# Patient Record
Sex: MALE | Race: WHITE | Employment: FULL TIME | URBAN - NONMETROPOLITAN AREA
[De-identification: names, ages, dates, MRNs, and addresses within clinical notes are randomized per-mention and may not be internally consistent; named-entity substitution may affect disease eponyms.]

---

## 2017-06-05 ENCOUNTER — TELEPHONE (OUTPATIENT)
Dept: FAMILY MEDICINE CLINIC | Age: 42
End: 2017-06-05

## 2017-06-06 ENCOUNTER — TELEPHONE (OUTPATIENT)
Dept: FAMILY MEDICINE CLINIC | Age: 42
End: 2017-06-06

## 2017-06-21 ENCOUNTER — OFFICE VISIT (OUTPATIENT)
Dept: FAMILY MEDICINE CLINIC | Age: 42
End: 2017-06-21

## 2017-06-21 VITALS
HEART RATE: 102 BPM | HEIGHT: 69 IN | DIASTOLIC BLOOD PRESSURE: 82 MMHG | SYSTOLIC BLOOD PRESSURE: 132 MMHG | BODY MASS INDEX: 25.39 KG/M2 | TEMPERATURE: 98.8 F | WEIGHT: 171.4 LBS

## 2017-06-21 DIAGNOSIS — L98.9 SKIN LESION OF SCALP: ICD-10-CM

## 2017-06-21 DIAGNOSIS — M75.82 ROTATOR CUFF TENDINITIS, LEFT: ICD-10-CM

## 2017-06-21 DIAGNOSIS — Z13.1 ENCOUNTER FOR SCREENING FOR DIABETES MELLITUS: Primary | ICD-10-CM

## 2017-06-21 DIAGNOSIS — I10 ESSENTIAL HYPERTENSION: ICD-10-CM

## 2017-06-21 PROCEDURE — 99203 OFFICE O/P NEW LOW 30 MIN: CPT | Performed by: NURSE PRACTITIONER

## 2017-06-21 ASSESSMENT — PATIENT HEALTH QUESTIONNAIRE - PHQ9
2. FEELING DOWN, DEPRESSED OR HOPELESS: 0
SUM OF ALL RESPONSES TO PHQ9 QUESTIONS 1 & 2: 0
1. LITTLE INTEREST OR PLEASURE IN DOING THINGS: 0
SUM OF ALL RESPONSES TO PHQ QUESTIONS 1-9: 0

## 2017-10-19 ENCOUNTER — OFFICE VISIT (OUTPATIENT)
Dept: CARDIOLOGY CLINIC | Age: 42
End: 2017-10-19
Payer: MEDICAID

## 2017-10-19 VITALS
SYSTOLIC BLOOD PRESSURE: 122 MMHG | HEIGHT: 69 IN | DIASTOLIC BLOOD PRESSURE: 64 MMHG | BODY MASS INDEX: 25.62 KG/M2 | WEIGHT: 173 LBS | HEART RATE: 82 BPM

## 2017-10-19 DIAGNOSIS — F17.200 SMOKING: ICD-10-CM

## 2017-10-19 DIAGNOSIS — I10 ESSENTIAL HYPERTENSION: ICD-10-CM

## 2017-10-19 DIAGNOSIS — R07.9 CHEST PAIN, UNSPECIFIED TYPE: Primary | ICD-10-CM

## 2017-10-19 PROCEDURE — G8484 FLU IMMUNIZE NO ADMIN: HCPCS | Performed by: NUCLEAR MEDICINE

## 2017-10-19 PROCEDURE — G8417 CALC BMI ABV UP PARAM F/U: HCPCS | Performed by: NUCLEAR MEDICINE

## 2017-10-19 PROCEDURE — 99213 OFFICE O/P EST LOW 20 MIN: CPT | Performed by: NUCLEAR MEDICINE

## 2017-10-19 PROCEDURE — 4004F PT TOBACCO SCREEN RCVD TLK: CPT | Performed by: NUCLEAR MEDICINE

## 2017-10-19 PROCEDURE — 93000 ELECTROCARDIOGRAM COMPLETE: CPT | Performed by: NUCLEAR MEDICINE

## 2017-10-19 PROCEDURE — G8428 CUR MEDS NOT DOCUMENT: HCPCS | Performed by: NUCLEAR MEDICINE

## 2017-10-19 RX ORDER — AMLODIPINE BESYLATE 10 MG/1
10 TABLET ORAL DAILY
Qty: 90 TABLET | Refills: 4 | Status: SHIPPED | OUTPATIENT
Start: 2017-10-19 | End: 2018-11-08 | Stop reason: SDUPTHER

## 2017-10-19 RX ORDER — HYDROCHLOROTHIAZIDE 25 MG/1
12.5 TABLET ORAL DAILY
Qty: 45 TABLET | Refills: 4 | Status: SHIPPED | OUTPATIENT
Start: 2017-10-19 | End: 2018-11-08 | Stop reason: SDUPTHER

## 2017-10-19 NOTE — PROGRESS NOTES
[facility] [address] [address]  Dept: [phone]  Dept Fax: [phone]  Loc: [phone]    Visit Date: 10/19/2017    Marylou Sharma is a 40 y.o. male who presents today for:  Chief Complaint   Patient presents with    Check-Up    Hypertension     No more chest pain   No ER visits  Stress test was okay   No cath   Still smoking   No changes in breathing  BP is much better  No other new issues     HPI:  HPI  Past Medical History:   Diagnosis Date    Blind     left eye    Essential hypertension 6/21/2017    Hypertension     10/7/2015      Past Surgical History:   Procedure Laterality Date    EYE SURGERY      Glass eye placement, left eye. Family History   Problem Relation Age of Onset    Heart Disease Father     High Blood Pressure Father     High Blood Pressure Mother      Social History   Substance Use Topics    Smoking status: Current Every Day Smoker     Packs/day: 0.50     Years: 22.00     Types: Cigarettes    Smokeless tobacco: Never Used    Alcohol use Yes      Comment: socially      Current Outpatient Prescriptions   Medication Sig Dispense Refill    amLODIPine (NORVASC) 10 MG tablet Take 1 tablet by mouth daily 90 tablet 4    hydrochlorothiazide (HYDRODIURIL) 25 MG tablet Take 0.5 tablets by mouth daily 45 tablet 4    OMEPRAZOLE PO Take 20 mg by mouth Take 2 tabs daily       No current facility-administered medications for this visit.       No Known Allergies  Health Maintenance   Topic Date Due    HIV screen  05/17/1991    DTaP/Tdap/Td vaccine (1 - Tdap) 05/17/1995    Diabetes screen  05/17/2016    Flu vaccine (1) 09/01/2017    Lipid screen  10/08/2020    Pneumococcal med risk  Completed       Subjective:  Review of Systems  General:   No fever, no chills, No fatigue or weight loss  Pulmonary:    some dyspnea, no wheezing  Cardiac:    Denies recent chest pain,   GI:     No nausea or vomiting, no abdominal

## 2018-01-22 ENCOUNTER — APPOINTMENT (OUTPATIENT)
Dept: GENERAL RADIOLOGY | Age: 43
End: 2018-01-22
Payer: MEDICAID

## 2018-01-22 ENCOUNTER — HOSPITAL ENCOUNTER (OUTPATIENT)
Age: 43
Setting detail: OBSERVATION
Discharge: HOME | End: 2018-01-23
Attending: FAMILY MEDICINE | Admitting: INTERNAL MEDICINE
Payer: MEDICAID

## 2018-01-22 DIAGNOSIS — R07.9 CHEST PAIN, UNSPECIFIED TYPE: Primary | ICD-10-CM

## 2018-01-22 LAB
ALBUMIN SERPL-MCNC: 4.4 G/DL (ref 3.5–5.1)
ALP BLD-CCNC: 48 U/L (ref 38–126)
ALT SERPL-CCNC: 14 U/L (ref 11–66)
APTT: 29.6 SECONDS (ref 22–38)
AST SERPL-CCNC: 11 U/L (ref 5–40)
BACTERIA: NORMAL /HPF
BASOPHILS # BLD: 0.8 %
BASOPHILS ABSOLUTE: 0 THOU/MM3 (ref 0–0.1)
BILIRUB SERPL-MCNC: 0.4 MG/DL (ref 0.3–1.2)
BILIRUBIN URINE: NEGATIVE
BLOOD, URINE: NEGATIVE
BUN BLDV-MCNC: 13 MG/DL (ref 7–22)
CALCIUM SERPL-MCNC: 9.3 MG/DL (ref 8.5–10.5)
CASTS 2: NORMAL /LPF
CASTS UA: NORMAL /LPF
CHARACTER, URINE: CLEAR
CHLORIDE BLD-SCNC: 100 MEQ/L (ref 98–111)
CO2: 25 MEQ/L (ref 23–33)
COLOR: COLORLESS
CREAT SERPL-MCNC: 0.8 MG/DL (ref 0.4–1.2)
CRYSTALS, UA: NORMAL
EKG ATRIAL RATE: 73 BPM
EKG P AXIS: 70 DEGREES
EKG P-R INTERVAL: 144 MS
EKG Q-T INTERVAL: 398 MS
EKG QRS DURATION: 110 MS
EKG QTC CALCULATION (BAZETT): 438 MS
EKG R AXIS: 74 DEGREES
EKG T AXIS: 37 DEGREES
EKG VENTRICULAR RATE: 73 BPM
EOSINOPHIL # BLD: 3.4 %
EOSINOPHILS ABSOLUTE: 0.2 THOU/MM3 (ref 0–0.4)
EPITHELIAL CELLS, UA: NORMAL /HPF
GFR SERPL CREATININE-BSD FRML MDRD: > 90 ML/MIN/1.73M2
GLUCOSE BLD-MCNC: 129 MG/DL (ref 70–108)
GLUCOSE URINE: NEGATIVE MG/DL
HCT VFR BLD CALC: 44.2 % (ref 42–52)
HEMOGLOBIN: 15.4 GM/DL (ref 14–18)
INR BLD: 0.97 (ref 0.85–1.13)
KETONES, URINE: NEGATIVE
LACTIC ACID: 1.2 MMOL/L (ref 0.5–2.2)
LEUKOCYTE ESTERASE, URINE: NEGATIVE
LIPASE: 22.1 U/L (ref 5.6–51.3)
LYMPHOCYTES # BLD: 31.1 %
LYMPHOCYTES ABSOLUTE: 1.9 THOU/MM3 (ref 1–4.8)
MAGNESIUM: 2.1 MG/DL (ref 1.6–2.4)
MCH RBC QN AUTO: 32.7 PG (ref 27–31)
MCHC RBC AUTO-ENTMCNC: 34.8 GM/DL (ref 33–37)
MCV RBC AUTO: 93.8 FL (ref 80–94)
MISCELLANEOUS 2: NORMAL
MONOCYTES # BLD: 5.2 %
MONOCYTES ABSOLUTE: 0.3 THOU/MM3 (ref 0.4–1.3)
NITRITE, URINE: NEGATIVE
NUCLEATED RED BLOOD CELLS: 0 /100 WBC
OSMOLALITY CALCULATION: 277.5 MOSMOL/KG (ref 275–300)
PDW BLD-RTO: 13.4 % (ref 11.5–14.5)
PH UA: 7
PLATELET # BLD: 310 THOU/MM3 (ref 130–400)
PMV BLD AUTO: 7.2 MCM (ref 7.4–10.4)
POTASSIUM REFLEX MAGNESIUM: 3.5 MEQ/L (ref 3.5–5.2)
PRO-BNP: 27.7 PG/ML (ref 0–450)
PROTEIN UA: NEGATIVE
RBC # BLD: 4.71 MILL/MM3 (ref 4.7–6.1)
RBC URINE: NORMAL /HPF
RENAL EPITHELIAL, UA: NORMAL
SEG NEUTROPHILS: 59.5 %
SEGMENTED NEUTROPHILS ABSOLUTE COUNT: 3.6 THOU/MM3 (ref 1.8–7.7)
SODIUM BLD-SCNC: 138 MEQ/L (ref 135–145)
SPECIFIC GRAVITY, URINE: < 1.005 (ref 1–1.03)
TOTAL PROTEIN: 6.5 G/DL (ref 6.1–8)
TROPONIN T: < 0.01 NG/ML
TSH SERPL DL<=0.05 MIU/L-ACNC: 1.33 UIU/ML (ref 0.4–4.2)
UROBILINOGEN, URINE: 0.2 EU/DL
WBC # BLD: 6 THOU/MM3 (ref 4.8–10.8)
WBC UA: NORMAL /HPF
YEAST: NORMAL

## 2018-01-22 PROCEDURE — 83605 ASSAY OF LACTIC ACID: CPT

## 2018-01-22 PROCEDURE — 2580000003 HC RX 258: Performed by: FAMILY MEDICINE

## 2018-01-22 PROCEDURE — 84443 ASSAY THYROID STIM HORMONE: CPT

## 2018-01-22 PROCEDURE — 83690 ASSAY OF LIPASE: CPT

## 2018-01-22 PROCEDURE — 84484 ASSAY OF TROPONIN QUANT: CPT

## 2018-01-22 PROCEDURE — 99219 PR INITIAL OBSERVATION CARE/DAY 50 MINUTES: CPT | Performed by: INTERNAL MEDICINE

## 2018-01-22 PROCEDURE — 6370000000 HC RX 637 (ALT 250 FOR IP): Performed by: INTERNAL MEDICINE

## 2018-01-22 PROCEDURE — G0378 HOSPITAL OBSERVATION PER HR: HCPCS

## 2018-01-22 PROCEDURE — 85025 COMPLETE CBC W/AUTO DIFF WBC: CPT

## 2018-01-22 PROCEDURE — 81001 URINALYSIS AUTO W/SCOPE: CPT

## 2018-01-22 PROCEDURE — 2580000003 HC RX 258: Performed by: INTERNAL MEDICINE

## 2018-01-22 PROCEDURE — 83735 ASSAY OF MAGNESIUM: CPT

## 2018-01-22 PROCEDURE — 85730 THROMBOPLASTIN TIME PARTIAL: CPT

## 2018-01-22 PROCEDURE — 87040 BLOOD CULTURE FOR BACTERIA: CPT

## 2018-01-22 PROCEDURE — 83880 ASSAY OF NATRIURETIC PEPTIDE: CPT

## 2018-01-22 PROCEDURE — 80053 COMPREHEN METABOLIC PANEL: CPT

## 2018-01-22 PROCEDURE — 71045 X-RAY EXAM CHEST 1 VIEW: CPT

## 2018-01-22 PROCEDURE — 93005 ELECTROCARDIOGRAM TRACING: CPT

## 2018-01-22 PROCEDURE — 99285 EMERGENCY DEPT VISIT HI MDM: CPT

## 2018-01-22 PROCEDURE — 85610 PROTHROMBIN TIME: CPT

## 2018-01-22 PROCEDURE — 36415 COLL VENOUS BLD VENIPUNCTURE: CPT

## 2018-01-22 RX ORDER — HYDROCHLOROTHIAZIDE 12.5 MG/1
12.5 CAPSULE, GELATIN COATED ORAL DAILY
Status: DISCONTINUED | OUTPATIENT
Start: 2018-01-23 | End: 2018-01-23 | Stop reason: HOSPADM

## 2018-01-22 RX ORDER — PANTOPRAZOLE SODIUM 40 MG/1
40 TABLET, DELAYED RELEASE ORAL
Status: DISCONTINUED | OUTPATIENT
Start: 2018-01-23 | End: 2018-01-23 | Stop reason: HOSPADM

## 2018-01-22 RX ORDER — OMEPRAZOLE 20 MG/1
20 CAPSULE, DELAYED RELEASE ORAL DAILY
Status: DISCONTINUED | OUTPATIENT
Start: 2018-01-22 | End: 2018-01-22 | Stop reason: CLARIF

## 2018-01-22 RX ORDER — ASPIRIN 81 MG/1
324 TABLET, CHEWABLE ORAL ONCE
Status: COMPLETED | OUTPATIENT
Start: 2018-01-22 | End: 2018-01-22

## 2018-01-22 RX ORDER — ACETAMINOPHEN 325 MG/1
650 TABLET ORAL EVERY 4 HOURS PRN
Status: DISCONTINUED | OUTPATIENT
Start: 2018-01-22 | End: 2018-01-23 | Stop reason: HOSPADM

## 2018-01-22 RX ORDER — ONDANSETRON 2 MG/ML
4 INJECTION INTRAMUSCULAR; INTRAVENOUS EVERY 6 HOURS PRN
Status: DISCONTINUED | OUTPATIENT
Start: 2018-01-22 | End: 2018-01-23 | Stop reason: HOSPADM

## 2018-01-22 RX ORDER — SODIUM CHLORIDE 0.9 % (FLUSH) 0.9 %
10 SYRINGE (ML) INJECTION PRN
Status: DISCONTINUED | OUTPATIENT
Start: 2018-01-22 | End: 2018-01-23 | Stop reason: HOSPADM

## 2018-01-22 RX ORDER — SODIUM CHLORIDE 0.9 % (FLUSH) 0.9 %
10 SYRINGE (ML) INJECTION EVERY 12 HOURS SCHEDULED
Status: DISCONTINUED | OUTPATIENT
Start: 2018-01-22 | End: 2018-01-23 | Stop reason: HOSPADM

## 2018-01-22 RX ORDER — 0.9 % SODIUM CHLORIDE 0.9 %
500 INTRAVENOUS SOLUTION INTRAVENOUS ONCE
Status: COMPLETED | OUTPATIENT
Start: 2018-01-22 | End: 2018-01-22

## 2018-01-22 RX ADMIN — SODIUM CHLORIDE 500 ML: 9 INJECTION, SOLUTION INTRAVENOUS at 13:25

## 2018-01-22 RX ADMIN — ASPIRIN 324 MG: 81 TABLET, CHEWABLE ORAL at 16:15

## 2018-01-22 RX ADMIN — Medication 10 ML: at 22:22

## 2018-01-22 RX ADMIN — ACETAMINOPHEN 650 MG: 325 TABLET ORAL at 14:37

## 2018-01-22 ASSESSMENT — PAIN SCALES - GENERAL
PAINLEVEL_OUTOF10: 4
PAINLEVEL_OUTOF10: 1
PAINLEVEL_OUTOF10: 0
PAINLEVEL_OUTOF10: 1

## 2018-01-22 ASSESSMENT — ENCOUNTER SYMPTOMS
BACK PAIN: 0
COUGH: 0
NAUSEA: 1
DIARRHEA: 0
WHEEZING: 0
ABDOMINAL PAIN: 0
EYE REDNESS: 0
SORE THROAT: 0
RHINORRHEA: 0
SHORTNESS OF BREATH: 0
VOMITING: 0
EYE DISCHARGE: 0

## 2018-01-22 ASSESSMENT — PAIN DESCRIPTION - LOCATION
LOCATION: CHEST
LOCATION: CHEST

## 2018-01-22 ASSESSMENT — PAIN DESCRIPTION - ORIENTATION: ORIENTATION: LEFT

## 2018-01-22 ASSESSMENT — PAIN DESCRIPTION - PAIN TYPE
TYPE: ACUTE PAIN
TYPE: ACUTE PAIN

## 2018-01-22 ASSESSMENT — PAIN DESCRIPTION - DESCRIPTORS: DESCRIPTORS: ACHING;HEAVINESS

## 2018-01-22 ASSESSMENT — PAIN DESCRIPTION - DIRECTION: RADIATING_TOWARDS: LEFT SHOULDER

## 2018-01-22 ASSESSMENT — PAIN DESCRIPTION - FREQUENCY: FREQUENCY: CONTINUOUS

## 2018-01-22 NOTE — H&P
Diet:       REVIEW OF SYSTEMS:   Pertinent positives as noted in the HPI. All other systems reviewed and negative. PHYSICAL EXAM:    /88   Pulse 61   Temp 97.8 °F (36.6 °C) (Oral)   Resp 20   Ht 5' 9\" (1.753 m)   Wt 175 lb (79.4 kg)   SpO2 96%   BMI 25.84 kg/m²     General appearance:  No apparent distress, appears stated age and cooperative. HEENT:  Normal cephalic, atraumatic without obvious deformity. Pupils equal, round, and reactive to light. Extra ocular muscles intact. Conjunctivae/corneas clear. Neck: Supple, with full range of motion. No jugular venous distention. Trachea midline. Respiratory:  Normal respiratory effort. Clear to auscultation, bilaterally without Rales/Wheezes/Rhonchi. Cardiovascular:  Regular rate and rhythm with normal S1/S2 without murmurs, rubs or gallops. Abdomen: Soft, non-tender, non-distended with normal bowel sounds. Musculoskeletal:  No clubbing, cyanosis or edema bilaterally. Full range of motion without deformity. Skin: Skin color, texture, turgor normal.  No rashes or lesions. Neurologic:  Neurovascularly intact without any focal sensory/motor deficits. Cranial nerves: II-XII intact, grossly non-focal.  Psychiatric:  Alert and oriented, thought content appropriate, normal insight  Capillary Refill: Brisk,< 3 seconds   Peripheral Pulses: +2 palpable, equal bilaterally       Labs:     Recent Labs      01/22/18   1150   WBC  6.0   HGB  15.4   HCT  44.2   PLT  310     Recent Labs      01/22/18   1150   NA  138   CL  100   CO2  25   BUN  13   CREATININE  0.8   CALCIUM  9.3     Recent Labs      01/22/18   1150   AST  11   ALT  14   BILITOT  0.4   ALKPHOS  48     Recent Labs      01/22/18   1150   INR  0.97     No results for input(s): Dru Serrato in the last 72 hours.     Urinalysis:      Lab Results   Component Value Date    NITRU NEGATIVE 01/22/2018    WBCUA NONE SEEN 01/22/2018    BACTERIA NONE 01/22/2018    RBCUA NONE SEEN 01/22/2018 BLOODU NEGATIVE 01/22/2018    GLUCOSEU NEGATIVE 01/22/2018       Radiology:     CXR: I have reviewed the CXR with the following interpretation: nad  EKG:  I have reviewed the EKG with the following interpretation: no acute change    XR CHEST PORTABLE   Final Result   Normal chest            **This report has been created using voice recognition software. It may contain minor errors which are inherent in voice recognition technology. **      Final report electronically signed by Dr. Wyatt Yuan on 1/22/2018 12:45 PM               DVT prophylaxis: [] Lovenox                                 [] SCDs                                 [] SQ Heparin                                 [x] Encourage ambulation           [] Already on Anticoagulation    Code Status: Prior          Disposition:    [x] Home       [] TCU       [] Rehab       [] Psych       [] SNF       [] [facility]       [] Other-    ASSESSMENT:    Active Hospital Problems    Diagnosis Date Noted    Chest pain [R07.9]        PLAN:  1. Chest pain- low prob this is cardiac- stress in am if markers neg  2. hbp controlled    Thank you Chula Mas CNP for the opportunity to be involved in this patient's care.     Electronically signed by Margarito Jama MD on 1/22/2018 at 2:00 PM

## 2018-01-22 NOTE — ED NOTES
Pt transported to  [facility] by cart in stable condition. Pt monitored on telemetry. IV fluids running and IV is patent.        Dave Wills, DAYANARA  [phone]/75/[phone]

## 2018-01-22 NOTE — ED PROVIDER NOTES
[facility]  eMERGENCY dEPARTMENT eNCOUnter          [address] [facility]       Chief Complaint   Patient presents with    Chest Pain     Left side chest that radiates to left shoulder on occasion       Nurses Notes reviewed and I agree except as noted in the HPI. HISTORY OF PRESENT ILLNESS    Lindsay Crabtree is a 40 y.o. male who presents to the Emergency Department for the evaluation of left sided chest pain. The patient has a history of hypertension. He follows with Dr. Rosemary Stark for cardiology and his last stress test was 2 to 3 years ago. He does not have any stents. The patient began to experience this chest pain when he woke up this morning at 7:30 AM. This pain has been intermittently throughout the morning and radiates into the left shoulder. Patient rates his current pain at a 4/10 in severity. He describes the pain as aching/heavy in character. He has not taken any Aspirin today. He is a smoker. He usually smokes a 1/2 pack per day. He has had one cigarette today so far. His father does have a history of heart disease and is still living. Patient reports associated nausea. He denies shortness of breath or any other complaints or symptoms at this time. The HPI was provided by the patient. REVIEW OF SYSTEMS     Review of Systems   Constitutional: Negative for appetite change, chills, fatigue and fever. HENT: Negative for congestion, ear pain, rhinorrhea and sore throat. Eyes: Negative for discharge, redness and visual disturbance. Respiratory: Negative for cough, shortness of breath and wheezing. Cardiovascular: Positive for chest pain (radiates into left shoulder). Negative for palpitations and leg swelling. Gastrointestinal: Positive for nausea. Negative for abdominal pain, diarrhea and vomiting. Genitourinary: Negative for decreased urine volume, difficulty urinating and dysuria.    Musculoskeletal: Negative for arthralgias, back pain, joint presence, and it accurately records my words and actions.     Roseanne Dunham MD 1/22/18 3:16 PM                      Roseanne Dunham MD  01/23/18 [id number]

## 2018-01-23 VITALS
RESPIRATION RATE: 17 BRPM | SYSTOLIC BLOOD PRESSURE: 131 MMHG | DIASTOLIC BLOOD PRESSURE: 80 MMHG | WEIGHT: 175 LBS | BODY MASS INDEX: 25.92 KG/M2 | HEIGHT: 69 IN | OXYGEN SATURATION: 98 % | HEART RATE: 67 BPM | TEMPERATURE: 98.3 F

## 2018-01-23 PROBLEM — F17.200 TOBACCO USE DISORDER: Status: ACTIVE | Noted: 2018-01-23

## 2018-01-23 LAB
LV EF: 50 %
LVEF MODALITY: NORMAL

## 2018-01-23 PROCEDURE — G0378 HOSPITAL OBSERVATION PER HR: HCPCS

## 2018-01-23 PROCEDURE — 99217 PR OBSERVATION CARE DISCHARGE MANAGEMENT: CPT | Performed by: INTERNAL MEDICINE

## 2018-01-23 PROCEDURE — 2580000003 HC RX 258: Performed by: INTERNAL MEDICINE

## 2018-01-23 PROCEDURE — 93017 CV STRESS TEST TRACING ONLY: CPT | Performed by: INTERNAL MEDICINE

## 2018-01-23 PROCEDURE — 6370000000 HC RX 637 (ALT 250 FOR IP): Performed by: INTERNAL MEDICINE

## 2018-01-23 PROCEDURE — 78452 HT MUSCLE IMAGE SPECT MULT: CPT

## 2018-01-23 PROCEDURE — A9500 TC99M SESTAMIBI: HCPCS | Performed by: INTERNAL MEDICINE

## 2018-01-23 PROCEDURE — 3430000000 HC RX DIAGNOSTIC RADIOPHARMACEUTICAL: Performed by: INTERNAL MEDICINE

## 2018-01-23 RX ORDER — OMEPRAZOLE 10 MG/1
10 CAPSULE, DELAYED RELEASE ORAL DAILY
COMMUNITY
End: 2018-01-29 | Stop reason: ALTCHOICE

## 2018-01-23 RX ORDER — ASPIRIN 81 MG/1
81 TABLET, CHEWABLE ORAL DAILY
Qty: 30 TABLET | Refills: 3 | Status: SHIPPED | OUTPATIENT
Start: 2018-01-23

## 2018-01-23 RX ADMIN — ACETAMINOPHEN 650 MG: 325 TABLET ORAL at 14:53

## 2018-01-23 RX ADMIN — Medication 10 ML: at 08:17

## 2018-01-23 RX ADMIN — PANTOPRAZOLE SODIUM 40 MG: 40 TABLET, DELAYED RELEASE ORAL at 06:04

## 2018-01-23 RX ADMIN — Medication 9.1 MILLICURIE: at 12:14

## 2018-01-23 RX ADMIN — Medication 30.1 MILLICURIE: at 13:24

## 2018-01-23 RX ADMIN — HYDROCHLOROTHIAZIDE 12.5 MG: 12.5 CAPSULE ORAL at 06:05

## 2018-01-23 ASSESSMENT — PAIN SCALES - GENERAL
PAINLEVEL_OUTOF10: 3
PAINLEVEL_OUTOF10: 3

## 2018-01-23 ASSESSMENT — PAIN DESCRIPTION - PAIN TYPE: TYPE: ACUTE PAIN

## 2018-01-23 ASSESSMENT — PAIN DESCRIPTION - LOCATION: LOCATION: HEAD

## 2018-01-23 NOTE — PROGRESS NOTES
Pharmacy Medication History Note      List of current medications patient is taking is complete. Source of information: patient    Changes made to medication list:  Medications removed (include reason, ex. therapy complete or physician discontinued):  · None    Medications added/doses adjusted:  · Changed omeprazole 20mg - now omeprazole 10mg 1 PO QD (per pt: this is OTC, not Rx)    Other notes (ex. Recent course of antibiotics, Coumadin dosing):  · Denies use of other OTC or herbal medications.       Allergies reviewed      Electronically signed by Veena Campuzano on 1/23/2018 at 10:40 AM

## 2018-01-23 NOTE — CARE COORDINATION
1/23/18, 2:18 PM      Alex Last       Admitted from: ER 1/22/2018/ 1115 Hospital day: 0   Location: [facility]10/010-A Reason for admit: Chest pain [R07.9] Status: OBS   Admit order signed?: yes  PMH:  has a past medical history of Blind; Essential hypertension; and Hypertension. Medications:  Scheduled Meds:   hydrochlorothiazide  12.5 mg Oral Daily    sodium chloride flush  10 mL Intravenous 2 times per day    pantoprazole  40 mg Oral QAM AC     Continuous Infusions:    Pertinent Info/Orders/Treatment Plan:  Trops neg. Telemetry, NSR. Stress test today, results pending. Diet: DIET GENERAL;   DVT Prophylaxis: SCD's ordered  Smoking status:  reports that he has been smoking Cigarettes. He has a 11.00 pack-year smoking history. He has never used smokeless tobacco.   Influenza Vaccination Screening Completed: yes  Pneumonia Vaccination Screening Completed: yes  Core measures: vte  PCP: Emerald Hobbs CNP  Readmission:   no  Risk Score:       Discharge Planning  Current Residence:  Private Residence  Living Arrangements:  Spouse/Significant Other   Support Systems:  Family Members, Spouse/Significant Other  Current Services PTA:     Potential Assistance Needed:  N/A  Potential Assistance Purchasing Medications:  No  Does patient want to participate in local refill/ meds to beds program?  N/A  Type of Home Care Services:  None  Patient expects to be discharged to:  home  Expected Discharge date:  01/23/18  Follow Up Appointment: Best Day/ Time:  (no preference)    Discharge Plan: Valarie Akhtar is from home with his s.o. No needs anticipated at this time.       Evaluation: no

## 2018-01-23 NOTE — DISCHARGE SUMMARY
Hospital Medicine Discharge Summary      Patient Identification:   Екатерина Soria   : 1975  MRN: [id number]   Account: [de-identified]      Patient's PCP: Ariel Mendez CNP    Admit Date: 2018     Discharge Date:   18    Admitting Physician: Juan Daniel Potts MD     Discharge Physician: Vito Melendez MD     Discharge Diagnoses: Active Hospital Problems    Diagnosis Date Noted    Tobacco use disorder [F17.200] 2018    Chest pain [R07.9]    Hypertension    The patient was seen and examined on day of discharge and this discharge summary is in conjunction with any daily progress note from day of discharge. Hospital Course:   Екатерина Soria is a 40 y.o. male admitted to [address] [place] [address] on 2018 for chest pain. Pain now resolved. Had Stress test that was negative. Trops negative. Will d/c. Tobacco cessation counseling    Exam:     Vitals:  Vitals:    18 0813 18 1143 18 1439 18 1620   BP: 115/71 124/78 128/89 131/80   Pulse: 73 66 76 67   Resp:    Temp: 98.4 °F (36.9 °C) 97.8 °F (36.6 °C) 98 °F (36.7 °C) 98.3 °F (36.8 °C)   TempSrc: Oral Oral Oral Oral   SpO2: 99% 96% 97% 98%   Weight:       Height:         Weight: Weight: 175 lb (79.4 kg)     24 hour intake/output:  Intake/Output Summary (Last 24 hours) at 18 1718  Last data filed at 18 0330   Gross per 24 hour   Intake              480 ml   Output                0 ml   Net              480 ml         General appearance:  No apparent distress, appears stated age and cooperative. HEENT:NC/AT Conjunctivae/corneas clear. Neck: Supple, with full range of motion. Respiratory:  Normal respiratory effort. Clear to auscultation,  Cardiovascular:  Regular rate and rhythm with normal S1/S2  Abdomen: Soft, non-tender, non-distended with normal bowel sounds. Musculoskeletal:  Full range of motion without deformity.   Skin: Skin color, texture, turgor normal.  No rashes or lesions. Neurologic:  Grossly non-focal.  Psychiatric:  Alert and oriented, thought content appropriate, normal insight  Capillary Refill: Brisk,< 3 seconds   Peripheral Pulses: +2 palpable, equal bilaterally       Labs: For convenience and continuity at follow-up the following most recent labs are provided:      CBC:    Lab Results   Component Value Date    WBC 6.0 01/22/2018    HGB 15.4 01/22/2018    HCT 44.2 01/22/2018     01/22/2018       Renal:  Lab Results   Component Value Date     01/22/2018    K 3.8 03/13/2017     01/22/2018    CO2 25 01/22/2018    BUN 13 01/22/2018    CREATININE 0.8 01/22/2018    CALCIUM 9.3 01/22/2018         Significant Diagnostic Studies    Radiology:   XR CHEST PORTABLE   Final Result   Normal chest            **This report has been created using voice recognition software. It may contain minor errors which are inherent in voice recognition technology. **      Final report electronically signed by Dr. Huseyin Vogt on 1/22/2018 12:45 PM             Consults:     None    Disposition:    [x] Home       [] TCU       [] Rehab       [] Psych       [] SNF       [] [facility]       [] Other-    Condition at Discharge: Stable    Code Status:  Full Code     Patient Instructions:    Discharge lab work:    Activity: activity as tolerated  Diet: DIET GENERAL;      Follow-up visits:   Wiley Iqbal CNP  [address] [facility]  [address] [address] [address] [facility],[address], [address] [place] [address]  [phone]    In 1 week           Discharge Medications:      [facility] Home Medication Instructions VQN:[id number]    Printed on:01/23/18 1223   Medication Information                      amLODIPine (NORVASC) 10 MG tablet  Take 1 tablet by mouth daily             hydrochlorothiazide (HYDRODIURIL) 25 MG tablet  Take 0.5 tablets by mouth daily             omeprazole (PRILOSEC) 10 MG delayed release capsule  Take 10 mg by mouth daily                 Time Spent on discharge is more than 30 minutes in the examination, evaluation, counseling and review of medications and discharge plan. Signed: Thank you Wendy Boswell CNP for the opportunity to be involved in this patient's care.     Electronically signed by Rosangela Leal MD on 1/23/2018 at 5:18 PM

## 2018-01-24 ENCOUNTER — TELEPHONE (OUTPATIENT)
Dept: FAMILY MEDICINE CLINIC | Age: 43
End: 2018-01-24

## 2018-01-28 LAB
BLOOD CULTURE, ROUTINE: NORMAL
BLOOD CULTURE, ROUTINE: NORMAL

## 2018-01-29 ENCOUNTER — OFFICE VISIT (OUTPATIENT)
Dept: FAMILY MEDICINE CLINIC | Age: 43
End: 2018-01-29
Payer: MEDICAID

## 2018-01-29 VITALS
RESPIRATION RATE: 12 BRPM | HEIGHT: 69 IN | HEART RATE: 72 BPM | DIASTOLIC BLOOD PRESSURE: 76 MMHG | BODY MASS INDEX: 26.54 KG/M2 | SYSTOLIC BLOOD PRESSURE: 112 MMHG | TEMPERATURE: 97.7 F | WEIGHT: 179.2 LBS

## 2018-01-29 DIAGNOSIS — M25.512 LEFT SHOULDER PAIN, UNSPECIFIED CHRONICITY: ICD-10-CM

## 2018-01-29 DIAGNOSIS — I10 ESSENTIAL HYPERTENSION: ICD-10-CM

## 2018-01-29 DIAGNOSIS — R07.9 CHEST PAIN, UNSPECIFIED TYPE: Primary | ICD-10-CM

## 2018-01-29 DIAGNOSIS — K21.9 GASTROESOPHAGEAL REFLUX DISEASE WITHOUT ESOPHAGITIS: ICD-10-CM

## 2018-01-29 DIAGNOSIS — F17.200 TOBACCO USE DISORDER: ICD-10-CM

## 2018-01-29 DIAGNOSIS — R73.01 IMPAIRED FASTING GLUCOSE: ICD-10-CM

## 2018-01-29 PROCEDURE — 99496 TRANSJ CARE MGMT HIGH F2F 7D: CPT | Performed by: NURSE PRACTITIONER

## 2018-01-29 RX ORDER — NAPROXEN 375 MG/1
375 TABLET ORAL 2 TIMES DAILY PRN
Qty: 60 TABLET | Refills: 3 | Status: SHIPPED | OUTPATIENT
Start: 2018-01-29 | End: 2018-08-10

## 2018-01-29 RX ORDER — ESOMEPRAZOLE MAGNESIUM 10 MG/1
GRANULE, FOR SUSPENSION, EXTENDED RELEASE ORAL
COMMUNITY
End: 2020-05-13

## 2018-01-29 NOTE — PATIENT INSTRUCTIONS
until it touches your belly. Slowly move it back to where you started. 5. Keep your elbow and upper arm firmly tucked against the towel roll or at your side. 6. Repeat 8 to 12 times. Pendulum swing    If you have pain in your back, do not do this exercise. 1. Hold on to a table or the back of a chair with your good arm. Then bend forward a little and let your sore arm hang straight down. This exercise does not use the arm muscles. Rather, use your legs and your hips to create movement that makes your arm swing freely. 2. Use the movement from your hips and legs to guide the slightly swinging arm back and forth like a pendulum (or elephant trunk). Then guide it in circles that start small (about the size of a dinner plate). Make the circles a bit larger each day, as your pain allows. 3. Do this exercise for 5 minutes, 5 to 7 times each day. 4. As you have less pain, try bending over a little farther to do this exercise. This will increase the amount of movement at your shoulder. Follow-up care is a key part of your treatment and safety. Be sure to make and go to all appointments, and call your doctor if you are having problems. It's also a good idea to know your test results and keep a list of the medicines you take. Where can you learn more? Go to https://[employer]Tripshare. org and sign in to your [employer] account. Enter H562 in the [employer] box to learn more about \"Shoulder Arthritis: Exercises. \"     If you do not have an account, please click on the \"Sign Up Now\" link. Current as of: March 21, 2017  Content Version: 11.5  © 7653-1705 [employer]. Care instructions adapted under license by [facility] ([facility]). If you have questions about a medical condition or this instruction, always ask your healthcare professional. [place] [address] any warranty or liability for your use of this information.           Rotator Cuff: Exercises  Your Care Instructions  Here Retraction    For this exercise, you will need elastic exercise material, such as surgical tubing or Thera-Band. 1. Put the band around a solid object at about waist level. (A bedpost will work well.) Each hand should hold an end of the band. 2. With your elbows at your sides and bent to 90 degrees, pull the band back. Your shoulder blades should move toward each other. Then move your arms back where you started. 3. Repeat 8 to 12 times. 4. If you have good range of motion in your shoulders, try this exercise with your arms lifted out to the sides. Keep your elbows at a 90-degree angle. Raise the elastic band up to about shoulder level. Pull the band back to move your shoulder blades toward each other. Then move your arms back where you started. Internal rotator strengthening exercise    1. Start by tying a piece of elastic exercise material to a doorknob. You can use surgical tubing or Thera-Band. 2. Stand or sit with your shoulder relaxed and your elbow bent 90 degrees. Your upper arm should rest comfortably against your side. Squeeze a rolled towel between your elbow and your body for comfort. This will help keep your arm at your side. 3. Hold one end of the elastic band in the hand of the painful arm. 4. Slowly rotate your forearm toward your body until it touches your belly. Slowly move it back to where you started. 5. Keep your elbow and upper arm firmly tucked against the towel roll or at your side. 6. Repeat 8 to 12 times. External rotator strengthening exercise    1. Start by tying a piece of elastic exercise material to a doorknob. You can use surgical tubing or Thera-Band. (You may also hold one end of the band in each hand.)  2. Stand or sit with your shoulder relaxed and your elbow bent 90 degrees. Your upper arm should rest comfortably against your side. Squeeze a rolled towel between your elbow and your body for comfort. This will help keep your arm at your side.   3. Hold one end of the

## 2018-01-29 NOTE — PROGRESS NOTES
120/80  Current Medication regimen - HCTZ and Norvasc  Tolerating medications well? - yes    Shortness of breath or chest pain? No  Headache or visual complaints? No  Neurologic changes like confusion? No  Extremity edema? No    BP Readings from Last 3 Encounters:   01/29/18 112/76   01/23/18 131/80   10/19/17 122/64           Current Outpatient Prescriptions   Medication Sig Dispense Refill    Multiple Vitamin (MULTI VITAMIN DAILY PO) Take by mouth      Esomeprazole Magnesium (NEXIUM) 10 MG PACK Take by mouth      naproxen (NAPROSYN) 375 MG tablet Take 1 tablet by mouth 2 times daily as needed for Pain 60 tablet 3    aspirin (ASPIRIN CHILDRENS) 81 MG chewable tablet Take 1 tablet by mouth daily 30 tablet 3    hydrochlorothiazide (HYDRODIURIL) 25 MG tablet Take 0.5 tablets by mouth daily 45 tablet 4    amLODIPine (NORVASC) 10 MG tablet Take 1 tablet by mouth daily 90 tablet 4     No current facility-administered medications for this visit. Past Medical History:   Diagnosis Date    Blind     left eye    Essential hypertension 6/21/2017    Gastroesophageal reflux disease without esophagitis 1/29/2018    Gastroesophageal reflux disease without esophagitis 1/29/2018    Hypertension     10/7/2015    Tobacco use disorder 1/23/2018       Past Surgical History:   Procedure Laterality Date    EYE SURGERY      Glass eye placement, left eye.         Social History   Substance Use Topics    Smoking status: Current Every Day Smoker     Packs/day: 0.50     Years: 22.00     Types: Cigarettes    Smokeless tobacco: Never Used    Alcohol use 14.4 oz/week     24 Cans of beer per week      Comment: socially       Family History   Problem Relation Age of Onset    Heart Disease Father     High Blood Pressure Father     High Blood Pressure Mother          I have reviewed the patient's past medical history, past surgical history, allergies, medications, social and family history and I have made updates where

## 2018-02-16 ENCOUNTER — TELEPHONE (OUTPATIENT)
Dept: FAMILY MEDICINE CLINIC | Age: 43
End: 2018-02-16

## 2018-08-10 ENCOUNTER — HOSPITAL ENCOUNTER (EMERGENCY)
Age: 43
Discharge: HOME | End: 2018-08-10
Attending: EMERGENCY MEDICINE
Payer: MEDICAID

## 2018-08-10 VITALS
WEIGHT: 175 LBS | TEMPERATURE: 98.3 F | OXYGEN SATURATION: 97 % | BODY MASS INDEX: 25.84 KG/M2 | HEART RATE: 73 BPM | DIASTOLIC BLOOD PRESSURE: 82 MMHG | SYSTOLIC BLOOD PRESSURE: 131 MMHG | RESPIRATION RATE: 16 BRPM

## 2018-08-10 DIAGNOSIS — F17.200 TOBACCO USE DISORDER: ICD-10-CM

## 2018-08-10 DIAGNOSIS — M77.52 TENDINITIS OF LEFT FOOT: Primary | ICD-10-CM

## 2018-08-10 PROCEDURE — 99213 OFFICE O/P EST LOW 20 MIN: CPT | Performed by: EMERGENCY MEDICINE

## 2018-08-10 PROCEDURE — 99212 OFFICE O/P EST SF 10 MIN: CPT

## 2018-08-10 RX ORDER — NAPROXEN 500 MG/1
500 TABLET ORAL 2 TIMES DAILY WITH MEALS
Qty: 20 TABLET | Refills: 0 | Status: SHIPPED | OUTPATIENT
Start: 2018-08-10 | End: 2019-06-02

## 2018-08-10 ASSESSMENT — ENCOUNTER SYMPTOMS
EYE REDNESS: 0
VOMITING: 0
EYE DISCHARGE: 0
NAUSEA: 0
WHEEZING: 0
SHORTNESS OF BREATH: 0
SORE THROAT: 0
VOICE CHANGE: 0
BACK PAIN: 0
ABDOMINAL PAIN: 0
EYE PAIN: 0
STRIDOR: 0
TROUBLE SWALLOWING: 0
DIARRHEA: 0
SINUS PRESSURE: 0
COUGH: 0

## 2018-08-10 ASSESSMENT — PAIN DESCRIPTION - PAIN TYPE: TYPE: ACUTE PAIN

## 2018-08-10 ASSESSMENT — PAIN DESCRIPTION - ORIENTATION: ORIENTATION: LEFT

## 2018-08-10 ASSESSMENT — PAIN SCALES - GENERAL: PAINLEVEL_OUTOF10: 4

## 2018-08-10 ASSESSMENT — PAIN DESCRIPTION - LOCATION: LOCATION: FOOT

## 2018-08-10 NOTE — ED PROVIDER NOTES
discharge. No scleral icterus. Neck: Normal range of motion. No JVD present. No spinous process tenderness and no muscular tenderness present. Normal range of motion present. No thyromegaly present. Cardiovascular: Normal rate, regular rhythm, S1 normal, S2 normal, normal heart sounds, intact distal pulses and normal pulses. Exam reveals no gallop and no friction rub. No murmur heard. Pulmonary/Chest: Effort normal and breath sounds normal. No stridor. No tachypnea. No respiratory distress. He has no decreased breath sounds. He has no wheezes. He has no rhonchi. He has no rales. He exhibits no tenderness. No cough, lungs clear   Abdominal: Soft. Bowel sounds are normal. He exhibits no distension and no mass. There is no tenderness. There is no rebound and no guarding. Musculoskeletal: Normal range of motion. He exhibits no edema. Right lower leg: Normal.        Left lower leg: Normal.        Left foot: There is tenderness. There is normal range of motion, no bony tenderness, no swelling, normal capillary refill, no crepitus, no deformity and no laceration. Feet:    Lymphadenopathy:     He has no cervical adenopathy. Right cervical: No superficial cervical adenopathy present. Left cervical: No superficial cervical adenopathy present. Neurological: He is alert and oriented to person, place, and time. He has normal reflexes. No cranial nerve deficit. He exhibits normal muscle tone. Coordination normal.   Appropriate, distal neurovascular intact left lower extremity   Skin: Skin is warm and dry. No rash noted. He is not diaphoretic. No erythema. No rash or bruising   Psychiatric: He has a normal mood and affect. His behavior is normal. Judgment and thought content normal.   Nursing note and vitals reviewed. DIAGNOSTIC RESULTS   Labs:No results found for this visit on 08/10/18.     IMAGING:  No orders to display     URGENT CARE COURSE:     Vitals:    08/10/18 0817   BP:

## 2018-08-10 NOTE — LETTER
[address] [place] [address] Urgent Care  [address] [place], [place] [address]KT [facility] [place] [facility] RIBEIR[facility] [address].[facility] [address]  Phone: [phone]               August 10, 2018    Patient: Michael Chavez   YOB: 1975   Date of Visit: 8/10/2018       To Whom It May Concern:    Humberto aBca was seen and treated in our emergency department on 8/10/2018. He may return to work on 8/12/18.   No work August 10August 11, 2018      Sincerely,       Sudhir Garcia MD         Signature:__________________________________

## 2018-08-13 ENCOUNTER — HOSPITAL ENCOUNTER (EMERGENCY)
Age: 43
Discharge: HOME | End: 2018-08-13
Payer: COMMERCIAL

## 2018-08-13 ENCOUNTER — HOSPITAL ENCOUNTER (EMERGENCY)
Dept: GENERAL RADIOLOGY | Age: 43
Discharge: HOME | End: 2018-08-13
Payer: COMMERCIAL

## 2018-08-13 VITALS
HEIGHT: 69 IN | TEMPERATURE: 98.3 F | HEART RATE: 89 BPM | RESPIRATION RATE: 16 BRPM | BODY MASS INDEX: 25.92 KG/M2 | SYSTOLIC BLOOD PRESSURE: 131 MMHG | OXYGEN SATURATION: 96 % | WEIGHT: 175 LBS | DIASTOLIC BLOOD PRESSURE: 78 MMHG

## 2018-08-13 DIAGNOSIS — M77.52 TENDINITIS OF LEFT FOOT: ICD-10-CM

## 2018-08-13 DIAGNOSIS — S93.602A FOOT SPRAIN, LEFT, INITIAL ENCOUNTER: Primary | ICD-10-CM

## 2018-08-13 PROCEDURE — 2709999900 HC NON-CHARGEABLE SUPPLY

## 2018-08-13 PROCEDURE — 99213 OFFICE O/P EST LOW 20 MIN: CPT

## 2018-08-13 PROCEDURE — 73630 X-RAY EXAM OF FOOT: CPT

## 2018-08-13 PROCEDURE — L3260 AMBULATORY SURGICAL BOOT EAC: HCPCS

## 2018-08-13 PROCEDURE — 99213 OFFICE O/P EST LOW 20 MIN: CPT | Performed by: NURSE PRACTITIONER

## 2018-08-13 ASSESSMENT — ENCOUNTER SYMPTOMS
BACK PAIN: 0
VOMITING: 0
COLOR CHANGE: 0
RECTAL PAIN: 0
NAUSEA: 0
DIARRHEA: 0
SHORTNESS OF BREATH: 0

## 2018-08-13 ASSESSMENT — PAIN SCALES - GENERAL: PAINLEVEL_OUTOF10: 8

## 2018-08-13 ASSESSMENT — PAIN DESCRIPTION - LOCATION: LOCATION: FOOT

## 2018-08-13 NOTE — ED NOTES
To [facility] with complaitns of left foot pain. 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  Pain on top of foot     Royal Demetria RN  08/13/18 1983

## 2018-08-13 NOTE — ED PROVIDER NOTES
[facility]  Urgent Care Encounter       CHIEF COMPLAINT       Chief Complaint   Patient presents with    Fall     slipped down a ladder on a rail car at work, bent foot backwards       Nurses Notes reviewed and I agree except as noted in the HPI. HISTORY OF PRESENT ILLNESS   Eddie Zhu is a 44 y.o. male who presents For refill of all. The patient was at work today is the latter and his left foot bent backwards. Since this time the patient's left foot has been hurting worse than it was when he was seen on Friday. He denies numbness or tingling. He complains of decreased range of motion due to pain. HPI    REVIEW OF SYSTEMS     Review of Systems   Constitutional: Positive for activity change. Negative for chills and fever. Respiratory: Negative for shortness of breath. Gastrointestinal: Negative for diarrhea, nausea, rectal pain and vomiting. Musculoskeletal: Positive for arthralgias (left foot) and joint swelling (left foot). Negative for back pain, gait problem and myalgias. Skin: Negative for color change, pallor and rash. PAST MEDICAL HISTORY         Diagnosis Date    Blind     left eye    Essential hypertension 6/21/2017    Gastroesophageal reflux disease without esophagitis 1/29/2018    Gastroesophageal reflux disease without esophagitis 1/29/2018    Hypertension     10/7/2015    Tobacco use disorder 1/23/2018       SURGICAL HISTORY     Patient  has a past surgical history that includes eye surgery.     CURRENT MEDICATIONS       Current Discharge Medication List      CONTINUE these medications which have NOT CHANGED    Details   naproxen (NAPROSYN) 500 MG tablet Take 1 tablet by mouth 2 times daily (with meals) for 20 doses  Qty: 20 tablet, Refills: 0      Multiple Vitamin (MULTI VITAMIN DAILY PO) Take by mouth      Esomeprazole Magnesium (NEXIUM) 10 MG PACK Take by mouth      aspirin (ASPIRIN CHILDRENS) 81 MG chewable tablet Take 1 tablet by mouth and oriented to person, place, and time. Skin: Skin is warm and dry. Nursing note and vitals reviewed. DIAGNOSTIC RESULTS     Labs:No results found for this visit on 08/13/18. IMAGING:    XR FOOT LEFT (MIN 3 VIEWS)   Final Result      1. No acute fracture or malalignment. **This report has been created using voice recognition software. It may contain minor errors which are inherent in voice recognition technology. **      Final report electronically signed by Dr. Lamar Vazquez on 8/13/2018 1:18 PM            EKG:      URGENT CARE COURSE:     Vitals:    08/13/18 1247 08/13/18 1250   BP:  131/78   Pulse: 89    Resp: 16    Temp: 98.3 °F (36.8 °C)    TempSrc: Oral    SpO2: 96%    Weight: 175 lb (79.4 kg)    Height: 5' 9\" (1.753 m)        Medications - No data to display         PROCEDURES:  None    FINAL IMPRESSION      1. Foot sprain, left, initial encounter    2. Tendinitis of left foot          DISPOSITION/PLAN     The patient's physical exam is consistent with a foot sprain. He was given a return to work on light duty and is seated position or job. He is to follow-up with his PCP in occupational health. He is to use the naproxen he was given on Friday for pain. He is to the ER for any worsening symptoms. The patient was instructed to rest, ice, elevate the affected extremity today. The patient was agreeable and verbalized understanding of this plan of care. The patient is no concerns at time of discharge. He was ambulatory out of the department stable condition. He is placed in a postop shoe and Ace wrap to help provide support and help with pain.       PATIENT REFERRED TO:  Mikayla Friday, MARK Mitchell CNP  [address] / [place] [id number]      DISCHARGE MEDICATIONS:  Current Discharge Medication List          Current Discharge Medication List          Current Discharge Medication List          Jessenia Jester, APRN - CNP    (Please note that portions of this note were completed with a voice

## 2018-08-14 ENCOUNTER — HOSPITAL ENCOUNTER (OUTPATIENT)
Age: 43
Discharge: HOME | End: 2018-08-14

## 2018-08-21 ENCOUNTER — HOSPITAL ENCOUNTER (OUTPATIENT)
Dept: GENERAL RADIOLOGY | Age: 43
Discharge: HOME | End: 2018-08-21

## 2018-08-21 DIAGNOSIS — R52 PAIN: ICD-10-CM

## 2018-09-13 ENCOUNTER — OFFICE VISIT (OUTPATIENT)
Dept: FAMILY MEDICINE CLINIC | Age: 43
End: 2018-09-13
Payer: MEDICAID

## 2018-09-13 VITALS
BODY MASS INDEX: 25.91 KG/M2 | DIASTOLIC BLOOD PRESSURE: 88 MMHG | HEART RATE: 76 BPM | HEIGHT: 70 IN | TEMPERATURE: 98.1 F | SYSTOLIC BLOOD PRESSURE: 118 MMHG | RESPIRATION RATE: 14 BRPM | WEIGHT: 181 LBS

## 2018-09-13 DIAGNOSIS — B35.6 TINEA CRURIS: Primary | ICD-10-CM

## 2018-09-13 PROCEDURE — G8417 CALC BMI ABV UP PARAM F/U: HCPCS | Performed by: NURSE PRACTITIONER

## 2018-09-13 PROCEDURE — 4004F PT TOBACCO SCREEN RCVD TLK: CPT | Performed by: NURSE PRACTITIONER

## 2018-09-13 PROCEDURE — G8427 DOCREV CUR MEDS BY ELIG CLIN: HCPCS | Performed by: NURSE PRACTITIONER

## 2018-09-13 PROCEDURE — 99213 OFFICE O/P EST LOW 20 MIN: CPT | Performed by: NURSE PRACTITIONER

## 2018-09-13 RX ORDER — TOLNAFTATE 1 G/100G
POWDER TOPICAL
Qty: 108 G | Refills: 2 | Status: SHIPPED | OUTPATIENT
Start: 2018-09-13 | End: 2021-09-16

## 2018-09-13 RX ORDER — TERBINAFINE HYDROCHLORIDE 250 MG/1
250 TABLET ORAL DAILY
Qty: 14 TABLET | Refills: 0 | Status: SHIPPED | OUTPATIENT
Start: 2018-09-13 | End: 2018-09-27

## 2018-09-13 ASSESSMENT — PATIENT HEALTH QUESTIONNAIRE - PHQ9
SUM OF ALL RESPONSES TO PHQ QUESTIONS 1-9: 0
SUM OF ALL RESPONSES TO PHQ9 QUESTIONS 1 & 2: 0
1. LITTLE INTEREST OR PLEASURE IN DOING THINGS: 0
SUM OF ALL RESPONSES TO PHQ QUESTIONS 1-9: 0
2. FEELING DOWN, DEPRESSED OR HOPELESS: 0

## 2018-09-13 NOTE — PATIENT INSTRUCTIONS
Patient Education        Jock Itch: Care Instructions  Your Care Instructions  Jock itch is a fungal infection of the groin. The fungus that causes jock itch lives on your skin. It often affects male athletes, but anyone can get jock itch. You may get an itchy rash on your inner thighs and rear end (buttocks). It spreads and starts to itch when you sweat or are in steamy showers or locker rooms. Jock itch should end soon if you keep your skin dry after you clean it. You can treat jock itch at home with antifungal creams and powders that you can buy without a prescription. Follow-up care is a key part of your treatment and safety. Be sure to make and go to all appointments, and call your doctor if you are having problems. It's also a good idea to know your test results and keep a list of the medicines you take. How can you care for yourself at home? · Wash the rash with soap and water. · Wet a soft cloth with cool water alone or mixed with baking soda or essential oils such as lavender or stephen. Put the cool compress on the skin to relieve itching. · If you have large areas of blisters or sores, use compresses such as those made with Burow's solution (available without a prescription) to soothe and dry out the blisters. · Spread antifungal cream or powder over, and beyond the edge of, the rash. Follow the directions on the package. · If your doctor prescribed medicine, take it exactly as directed. Call your doctor if you have any problems with your medicine. · Try not to scratch the rash. · Shower or bathe daily and after you exercise. · Keep your skin dry as much as possible to allow it to heal.  · Until your jock itch is cured, wear loose-fitting cotton clothing. Avoid tight underwear, pants, and panty hose. · Wash your supporters and shorts after every wearing. · Do not share clothing, sports equipment, towels, or sheets to avoid spreading the fungi to other people.   To prevent jock itch  · Put on socks before you put on underwear if you have athlete's foot. This action helps prevent the fungus on your feet from spreading to your groin. · Wash your workout clothes, underwear, socks, and towels after each use. · Keep your groin, inner thighs, and buttocks clean and dry, especially after you exercise and shower. · Use a powder on your groin, inner thighs, and buttocks to help keep these areas dry. · Do not borrow or lend clothing, sports equipment, towels, or sheets. · Wear slippers or sandals in locker rooms, showers, and public bathing areas. When should you call for help? Call your doctor now or seek immediate medical care if:    · You have signs of infection, such as:  ¨ Increased pain, swelling, warmth, or redness. ¨ Red streaks leading from the rash. ¨ Pus draining from the rash. ¨ A fever.    Watch closely for changes in your health, and be sure to contact your doctor if:    · You do not get better as expected. Where can you learn more? Go to [email or web address] and sign in to your [employer] account. Enter G303 in the [employer] box to learn more about \"Jock Itch: Care Instructions. \"     If you do not have an account, please click on the \"Sign Up Now\" link. Current as of: October 5, 2017  Content Version: 11.7  © 0494-9757 [employer]. Care instructions adapted under license by [facility] ([facility]). If you have questions about a medical condition or this instruction, always ask your healthcare professional. [place] [address] any warranty or liability for your use of this information.

## 2018-09-13 NOTE — PROGRESS NOTES
Josh Aponte  is a 44 y.o. y/o male that presents for Rash (x 1 week, rash on inside of thighs, using OTC anti-itch meds/creams, has no spread, burns when scratched )      Rash    HPI:    Length of time Sx have been present - 1 week  Rash has gotten worse since initially starting  Affected areas - groin  Inciting events or exposures prior to rash starting? No  Pruritic? Yes  Erythematous? Yes  Weeping or drainage? No  History of Urticaria? No  Fever? No  Painful? Yes - burns    Review of Systems - General ROS: negative for - chills, fever or night sweats  Respiratory ROS: negative for - shortness of breath, stridor or wheezing      OBJECTIVE:  /88   Pulse 76   Temp 98.1 °F (36.7 °C) (Oral)   Resp 14   Ht 5' 10.25\" (1.784 m)   Wt 181 lb (82.1 kg)   BMI 25.79 kg/m²   He appears well; non-toxic and in no apparent distress. Mouth - mucous membranes moist, pharynx normal without lesions  Chest - clear to auscultation, no wheezes, rales or rhonchi, symmetric air entry  Heart - normal rate, regular rhythm, normal S1, S2, no murmurs, rubs, clicks or gallops  Extremities - no pedal edema noted, intact peripheral pulses  Skin - DERMATITIS NOTED: tinea on bilateral inner groin erythematous macular lesions with scale      ASSESSMENT & PLAN  Griselda Miller was seen today for rash. Diagnoses and all orders for this visit:    Tinea cruris  -     tolnaftate (TINACTIN) 1 % powder; Apply topically 2 times daily. -     terbinafine (LAMISIL) 250 MG tablet; Take 1 tablet by mouth daily for 14 days      - guidance and reassurance given  - keep area dry    Return if symptoms worsen or fail to improve. Griselda Miller received counseling on the following healthy behaviors: medication adherence  Reviewed prior labs and health maintenance. Continue current medications, diet and exercise. Discussed use, benefit, and side effects of prescribed medications. Barriers to medication compliance addressed.    Patient given educational materials - see patient instructions. All patient questions answered. Patient voiced understanding.

## 2018-11-08 RX ORDER — HYDROCHLOROTHIAZIDE 25 MG/1
12.5 TABLET ORAL DAILY
Qty: 45 TABLET | Refills: 3 | Status: SHIPPED | OUTPATIENT
Start: 2018-11-08 | End: 2018-11-19 | Stop reason: SDUPTHER

## 2018-11-08 RX ORDER — AMLODIPINE BESYLATE 10 MG/1
10 TABLET ORAL DAILY
Qty: 90 TABLET | Refills: 3 | Status: SHIPPED | OUTPATIENT
Start: 2018-11-08 | End: 2018-11-19 | Stop reason: SDUPTHER

## 2018-11-19 ENCOUNTER — OFFICE VISIT (OUTPATIENT)
Dept: CARDIOLOGY CLINIC | Age: 43
End: 2018-11-19
Payer: MEDICAID

## 2018-11-19 VITALS
HEART RATE: 76 BPM | HEIGHT: 69 IN | DIASTOLIC BLOOD PRESSURE: 78 MMHG | BODY MASS INDEX: 27.34 KG/M2 | WEIGHT: 184.6 LBS | SYSTOLIC BLOOD PRESSURE: 118 MMHG

## 2018-11-19 DIAGNOSIS — Z72.0 TOBACCO ABUSE: Primary | ICD-10-CM

## 2018-11-19 DIAGNOSIS — I10 ESSENTIAL HYPERTENSION: ICD-10-CM

## 2018-11-19 PROCEDURE — 4004F PT TOBACCO SCREEN RCVD TLK: CPT | Performed by: PHYSICIAN ASSISTANT

## 2018-11-19 PROCEDURE — 99213 OFFICE O/P EST LOW 20 MIN: CPT | Performed by: PHYSICIAN ASSISTANT

## 2018-11-19 PROCEDURE — G8417 CALC BMI ABV UP PARAM F/U: HCPCS | Performed by: PHYSICIAN ASSISTANT

## 2018-11-19 PROCEDURE — G8427 DOCREV CUR MEDS BY ELIG CLIN: HCPCS | Performed by: PHYSICIAN ASSISTANT

## 2018-11-19 PROCEDURE — 99406 BEHAV CHNG SMOKING 3-10 MIN: CPT | Performed by: PHYSICIAN ASSISTANT

## 2018-11-19 PROCEDURE — G8484 FLU IMMUNIZE NO ADMIN: HCPCS | Performed by: PHYSICIAN ASSISTANT

## 2018-11-19 RX ORDER — AMLODIPINE BESYLATE 10 MG/1
10 TABLET ORAL DAILY
Qty: 90 TABLET | Refills: 3 | Status: SHIPPED | OUTPATIENT
Start: 2018-11-19 | End: 2019-11-12 | Stop reason: SDUPTHER

## 2018-11-19 RX ORDER — HYDROCHLOROTHIAZIDE 25 MG/1
12.5 TABLET ORAL DAILY
Qty: 45 TABLET | Refills: 3 | Status: SHIPPED | OUTPATIENT
Start: 2018-11-19 | End: 2019-11-12 | Stop reason: SDUPTHER

## 2018-11-19 NOTE — PROGRESS NOTES
1 year follow up. Denies cp, sob, palpitations, dizziness, lightheaded and REBECCA. [facility] [address] [address]   Dept: [phone]   Dept Fax: [phone]   Loc: [phone]      Chief Complaint   Patient presents with    1 Year Follow Up    Hypertension     Patient with a history of hypertension presents for follow-up. Patient states overall he is been doing well. He denies any chest pain, shortness of breath or palpitations. He continues to smoke despite recommendations to stop. Cardiologist:  Dr. Kain Nielson:   No fever, no chills, No fatigue or weight loss  Pulmonary:    No dyspnea, no wheezing  Cardiac:    Denies recent chest pain   GI:     No nausea or vomiting, no abdominal pain  Neuro:    No dizziness or light headedness  Musculoskeletal:  No recent active issues  Extremities:   No edema, good peripheral pulses      Past Medical History:   Diagnosis Date    Blind     left eye    Essential hypertension 6/21/2017    Gastroesophageal reflux disease without esophagitis 1/29/2018    Gastroesophageal reflux disease without esophagitis 1/29/2018    Hypertension     10/7/2015    Tobacco use disorder 1/23/2018       No Known Allergies    Current Outpatient Prescriptions   Medication Sig Dispense Refill    amLODIPine (NORVASC) 10 MG tablet Take 1 tablet by mouth daily 90 tablet 3    hydrochlorothiazide (HYDRODIURIL) 25 MG tablet Take 0.5 tablets by mouth daily 45 tablet 3    tolnaftate (TINACTIN) 1 % powder Apply topically 2 times daily.  108 g 2    Multiple Vitamin (MULTI VITAMIN DAILY PO) Take by mouth      Esomeprazole Magnesium (NEXIUM) 10 MG PACK Take by mouth      aspirin (ASPIRIN CHILDRENS) 81 MG chewable tablet Take 1 tablet by mouth daily 30 tablet 3    naproxen (NAPROSYN) 500 MG tablet Take 1 tablet by mouth 2 times daily (with meals) for 20 doses 20 tablet 0     No current

## 2019-06-02 ENCOUNTER — HOSPITAL ENCOUNTER (EMERGENCY)
Age: 44
Discharge: HOME | End: 2019-06-02
Payer: MEDICAID

## 2019-06-02 VITALS
HEART RATE: 84 BPM | SYSTOLIC BLOOD PRESSURE: 132 MMHG | BODY MASS INDEX: 25.92 KG/M2 | DIASTOLIC BLOOD PRESSURE: 82 MMHG | TEMPERATURE: 99.3 F | HEIGHT: 69 IN | OXYGEN SATURATION: 96 % | WEIGHT: 175 LBS | RESPIRATION RATE: 16 BRPM

## 2019-06-02 DIAGNOSIS — D36.7 DERMOID CYST OF RIGHT LOWER EXTREMITY: ICD-10-CM

## 2019-06-02 DIAGNOSIS — R60.9 DEPENDENT EDEMA: Primary | ICD-10-CM

## 2019-06-02 PROCEDURE — 99213 OFFICE O/P EST LOW 20 MIN: CPT

## 2019-06-02 PROCEDURE — 99213 OFFICE O/P EST LOW 20 MIN: CPT | Performed by: NURSE PRACTITIONER

## 2019-06-02 ASSESSMENT — ENCOUNTER SYMPTOMS
CHEST TIGHTNESS: 0
COUGH: 0
APNEA: 0
COLOR CHANGE: 0
ABDOMINAL DISTENTION: 0
ABDOMINAL PAIN: 0
WHEEZING: 0
CHOKING: 0
BLOOD IN STOOL: 0
SHORTNESS OF BREATH: 0
BACK PAIN: 0
STRIDOR: 0
ANAL BLEEDING: 0

## 2019-06-02 NOTE — ED NOTES
Presents with c/o lump on right lower lateral leg x 3 days. Lump is not hard, red, warm to touch or tender. Pt does have swelling of right foot and ankle that has been present for a couple months and is no worse than usual.  Pt is being followed by [facility] for worker's comp injury of left foot from last summer and believes the swelling of right foot is realated to over compensation from injury. Has [facility] appt 6/17/19. Pt does ambulate with crutches since October 2018. Wife at bedside.       Teddy Diaz RN  06/02/19 7065

## 2019-06-02 NOTE — ED PROVIDER NOTES
[facility]  Urgent Care Encounter      CHIEF COMPLAINT       Chief Complaint   Patient presents with    Leg Swelling       Nurses Notes reviewed and I agree except as noted in the HPI. HISTORY OFPRESENT ILLNESS   Skip Wyman is a 38 y.o. The history is provided by the patient. No  was used. Leg Injury   Location:  Leg  Leg location:  R lower leg  Pain details:     Quality:  Unable to specify    Radiates to:  Does not radiate    Severity:  No pain    Onset quality:  Gradual    Duration:  3 days    Timing:  Constant (lump bump on outside of right lower leg)    Progression:  Worsening (seems to be getting bigger)  Chronicity:  New  Dislocation: no    Foreign body present:  No foreign bodies  Tetanus status:  Unknown  Prior injury to area:  Yes (hisotry of fall causing limited mobility. pt has been on crutches since Nov 2018. mainly uses the right leg.  believed swelling is due to over compensation duet o limited left leg mobility and use.)  Relieved by:  Nothing  Worsened by:  Nothing  Ineffective treatments:  None tried  Associated symptoms: swelling    Associated symptoms: no back pain, no decreased ROM, no fatigue, no fever, no itching, no muscle weakness, no neck pain, no numbness, no stiffness and no tingling    Associated symptoms comment:  Bijan Mclain reports right lower leg swelling does resolve overnight but returns daily with activity. Risk factors: no concern for non-accidental trauma, no frequent fractures, no known bone disorder, no obesity and no recent illness        REVIEW OF SYSTEMS     Review of Systems   Constitutional: Negative for activity change, appetite change, chills, diaphoresis, fatigue and fever. Respiratory: Negative for apnea, cough, choking, chest tightness, shortness of breath, wheezing and stridor. Cardiovascular: Negative for chest pain, palpitations and leg swelling.    Gastrointestinal: Negative for abdominal distention, abdominal pain, anal bleeding and blood in stool. Genitourinary: Negative for decreased urine volume, difficulty urinating and dysuria. Musculoskeletal: Positive for gait problem and myalgias. Negative for arthralgias, back pain, joint swelling, neck pain, neck stiffness and stiffness. Skin: Negative for color change, itching, pallor, rash and wound. PAST MEDICAL HISTORY         Diagnosis Date    Blind     left eye    Essential hypertension 6/21/2017    Gastroesophageal reflux disease without esophagitis 1/29/2018    Gastroesophageal reflux disease without esophagitis 1/29/2018    Hypertension     10/7/2015    Tobacco use disorder 1/23/2018       SURGICAL HISTORY     Patient  has a past surgical history that includes eye surgery. CURRENT MEDICATIONS       Discharge Medication List as of 6/2/2019 11:29 AM      CONTINUE these medications which have NOT CHANGED    Details   amLODIPine (NORVASC) 10 MG tablet Take 1 tablet by mouth daily, Disp-90 tablet, R-3Normal      hydrochlorothiazide (HYDRODIURIL) 25 MG tablet Take 0.5 tablets by mouth daily, Disp-45 tablet, R-3Normal      tolnaftate (TINACTIN) 1 % powder Apply topically 2 times daily. , Disp-108 g, R-2, Normal      Multiple Vitamin (MULTI VITAMIN DAILY PO) Take by mouthHistorical Med      Esomeprazole Magnesium (NEXIUM) 10 MG PACK Take by mouthHistorical Med      aspirin (ASPIRIN CHILDRENS) 81 MG chewable tablet Take 1 tablet by mouth daily, Disp-30 tablet, R-3Normal             ALLERGIES     Patient is has No Known Allergies. FAMILY HISTORY     Patient's family history includes Heart Disease in his father; High Blood Pressure in his father and mother. SOCIAL HISTORY     Patient  reports that he has been smoking cigarettes. He has a 11.00 pack-year smoking history. He has never used smokeless tobacco. He reports that he drinks about 14.4 oz of alcohol per week. He reports that he does not use drugs.     PHYSICAL EXAM     ED TRIAGE VITALS BP: 132/82, Temp: 99.3 °F (37.4 °C), Pulse: 84, Resp: 16, SpO2: 96 %  Physical Exam   Constitutional: He is oriented to person, place, and time. He appears well-developed and well-nourished. No distress. HENT:   Head: Normocephalic and atraumatic. Eyes: Conjunctivae and EOM are normal.   Neck: Normal range of motion. Cardiovascular: Intact distal pulses. Pulmonary/Chest: Effort normal.   Musculoskeletal: Normal range of motion. Right lower leg: He exhibits edema. He exhibits no tenderness, no bony tenderness, no swelling, no deformity and no laceration. Legs:       Right foot: There is swelling. There is normal range of motion, no tenderness, no bony tenderness, normal capillary refill, no crepitus, no deformity and no laceration. Lump on the outside aspect of the right lower leg is fluid nontender mild bluish discoloration noted. There is edema noted in the right leg this is the main weightbearing leg since November 2018. Neurological: He is alert and oriented to person, place, and time. Skin: Skin is warm. Capillary refill takes less than 2 seconds. He is not diaphoretic. Psychiatric: His behavior is normal. Judgment and thought content normal.   Nursing note and vitals reviewed. DIAGNOSTIC RESULTS   Labs:No results found for this visit on 06/02/19. IMAGING:  No orders to display     URGENT CARE COURSE:     Vitals:    06/02/19 1044   BP: 132/82   Pulse: 84   Resp: 16   Temp: 99.3 °F (37.4 °C)   TempSrc: Infrared   SpO2: 96%   Weight: 175 lb (79.4 kg)   Height: 5' 9\" (1.753 m)       Medications - No data to display  PROCEDURES:  None  FINAL IMPRESSION      1. Dependent edema    2.  Dermoid cyst of right lower extremity        DISPOSITION/PLAN   Decision To Discharge    Monitor for redness, drainage, pain   Monitor for any fevers  Keep clean and dry  Take medication as directed  Follow up with your PCP or return for any concerns   or go to the Emergency Department    PATIENT

## 2019-11-04 ENCOUNTER — TELEPHONE (OUTPATIENT)
Dept: CARDIOLOGY CLINIC | Age: 44
End: 2019-11-04

## 2019-11-12 ENCOUNTER — OFFICE VISIT (OUTPATIENT)
Dept: CARDIOLOGY CLINIC | Age: 44
End: 2019-11-12
Payer: MEDICAID

## 2019-11-12 VITALS
HEIGHT: 69 IN | DIASTOLIC BLOOD PRESSURE: 80 MMHG | HEART RATE: 80 BPM | BODY MASS INDEX: 27.11 KG/M2 | SYSTOLIC BLOOD PRESSURE: 118 MMHG | WEIGHT: 183 LBS

## 2019-11-12 DIAGNOSIS — I10 ESSENTIAL HYPERTENSION: Primary | ICD-10-CM

## 2019-11-12 DIAGNOSIS — Z72.0 TOBACCO ABUSE: ICD-10-CM

## 2019-11-12 PROCEDURE — 99406 BEHAV CHNG SMOKING 3-10 MIN: CPT | Performed by: PHYSICIAN ASSISTANT

## 2019-11-12 PROCEDURE — G8417 CALC BMI ABV UP PARAM F/U: HCPCS | Performed by: PHYSICIAN ASSISTANT

## 2019-11-12 PROCEDURE — 99213 OFFICE O/P EST LOW 20 MIN: CPT | Performed by: PHYSICIAN ASSISTANT

## 2019-11-12 PROCEDURE — 4004F PT TOBACCO SCREEN RCVD TLK: CPT | Performed by: PHYSICIAN ASSISTANT

## 2019-11-12 PROCEDURE — G8484 FLU IMMUNIZE NO ADMIN: HCPCS | Performed by: PHYSICIAN ASSISTANT

## 2019-11-12 PROCEDURE — 93000 ELECTROCARDIOGRAM COMPLETE: CPT | Performed by: PHYSICIAN ASSISTANT

## 2019-11-12 PROCEDURE — G8427 DOCREV CUR MEDS BY ELIG CLIN: HCPCS | Performed by: PHYSICIAN ASSISTANT

## 2019-11-12 RX ORDER — HYDROCHLOROTHIAZIDE 25 MG/1
12.5 TABLET ORAL DAILY
Qty: 45 TABLET | Refills: 3 | Status: SHIPPED | OUTPATIENT
Start: 2019-11-12 | End: 2020-11-10 | Stop reason: SDUPTHER

## 2019-11-12 RX ORDER — AMLODIPINE BESYLATE 10 MG/1
10 TABLET ORAL DAILY
Qty: 90 TABLET | Refills: 3 | Status: SHIPPED | OUTPATIENT
Start: 2019-11-12 | End: 2020-11-10 | Stop reason: SDUPTHER

## 2020-05-13 ENCOUNTER — HOSPITAL ENCOUNTER (EMERGENCY)
Age: 45
Discharge: HOME | End: 2020-05-13
Payer: MEDICAID

## 2020-05-13 VITALS
TEMPERATURE: 98.1 F | HEIGHT: 69 IN | DIASTOLIC BLOOD PRESSURE: 86 MMHG | RESPIRATION RATE: 12 BRPM | OXYGEN SATURATION: 97 % | HEART RATE: 77 BPM | SYSTOLIC BLOOD PRESSURE: 132 MMHG | BODY MASS INDEX: 25.92 KG/M2 | WEIGHT: 175 LBS

## 2020-05-13 PROCEDURE — 99214 OFFICE O/P EST MOD 30 MIN: CPT

## 2020-05-13 PROCEDURE — 99213 OFFICE O/P EST LOW 20 MIN: CPT | Performed by: NURSE PRACTITIONER

## 2020-05-13 RX ORDER — AMOXICILLIN AND CLAVULANATE POTASSIUM 875; 125 MG/1; MG/1
1 TABLET, FILM COATED ORAL 2 TIMES DAILY
Qty: 10 TABLET | Refills: 0 | Status: SHIPPED | OUTPATIENT
Start: 2020-05-13 | End: 2020-05-18

## 2020-05-13 RX ORDER — OMEPRAZOLE 10 MG/1
10 CAPSULE, DELAYED RELEASE ORAL DAILY
COMMUNITY

## 2020-05-13 RX ORDER — PSEUDOEPHEDRINE HYDROCHLORIDE 30 MG/1
30 TABLET ORAL EVERY 6 HOURS PRN
Qty: 20 TABLET | Refills: 0 | COMMUNITY
Start: 2020-05-13 | End: 2020-05-16

## 2020-05-13 RX ORDER — AZELASTINE 1 MG/ML
1 SPRAY, METERED NASAL 2 TIMES DAILY
Qty: 1 BOTTLE | Refills: 0 | Status: SHIPPED | OUTPATIENT
Start: 2020-05-13 | End: 2021-09-16

## 2020-05-13 RX ORDER — ACETAMINOPHEN 500 MG
1000 TABLET ORAL EVERY 6 HOURS PRN
COMMUNITY

## 2020-05-13 ASSESSMENT — ENCOUNTER SYMPTOMS
ABDOMINAL PAIN: 0
ANAL BLEEDING: 0
APNEA: 0
SINUS PRESSURE: 1
EYE PAIN: 1
CONSTIPATION: 0
EYE DISCHARGE: 0
CHOKING: 0
TINGLING: 0
SWOLLEN GLANDS: 0
EYE REDNESS: 0
CHEST TIGHTNESS: 0
ABDOMINAL DISTENTION: 0
DIARRHEA: 0
SORE THROAT: 0
RHINORRHEA: 1
PHOTOPHOBIA: 1
EYE ITCHING: 0
RECTAL PAIN: 0
WHEEZING: 0
NAUSEA: 0
BLURRED VISION: 0
STRIDOR: 0
BACK PAIN: 0
SHORTNESS OF BREATH: 0
VISUAL CHANGE: 0
BLOOD IN STOOL: 0
VOMITING: 0
COUGH: 1

## 2020-05-13 ASSESSMENT — PAIN SCALES - GENERAL: PAINLEVEL_OUTOF10: 2

## 2020-05-13 ASSESSMENT — PAIN - FUNCTIONAL ASSESSMENT: PAIN_FUNCTIONAL_ASSESSMENT: PREVENTS OR INTERFERES SOME ACTIVE ACTIVITIES AND ADLS

## 2020-05-13 ASSESSMENT — PAIN DESCRIPTION - PAIN TYPE: TYPE: ACUTE PAIN

## 2020-05-13 ASSESSMENT — PAIN DESCRIPTION - FREQUENCY: FREQUENCY: INTERMITTENT

## 2020-05-13 ASSESSMENT — PAIN DESCRIPTION - DESCRIPTORS: DESCRIPTORS: ACHING

## 2020-05-13 ASSESSMENT — PAIN DESCRIPTION - LOCATION: LOCATION: EYE

## 2020-05-13 ASSESSMENT — PAIN DESCRIPTION - ORIENTATION: ORIENTATION: RIGHT

## 2020-05-13 NOTE — ED PROVIDER NOTES
[facility]      CHIEF COMPLAINT       Chief Complaint   Patient presents with    Headache     behind rt. eye    Nasal Congestion     clear       Nurses Notes reviewed and I agree except as noted in the HPI. HISTORY OFPRESENT ILLNESS   Delebrandi Stoddard is a 38 y.o. The history is provided by the patient. No  was used. Headache   Pain location:  Frontal, R parietal and R temporal  Quality:  Stabbing  Radiates to:  Eyes  Severity currently:  2/10  Severity at highest:  8/10  Onset quality:  Sudden  Duration:  4 days  Timing:  Constant  Progression:  Worsening  Chronicity:  New  Similar to prior headaches: yes    Context: bright light    Context: not activity, not caffeine, not coughing, not defecating, not eating, not stress, not exposure to cold air, not intercourse, not loud noise and not straining    Relieved by:  Nothing  Worsened by:  Light  Ineffective treatments:  Acetaminophen (corciden)  Associated symptoms: congestion, cough, eye pain, photophobia and sinus pressure    Associated symptoms: no abdominal pain, no back pain, no blurred vision, no diarrhea, no dizziness, no drainage, no ear pain, no facial pain, no fatigue, no fever, no focal weakness, no hearing loss, no loss of balance, no myalgias, no nausea, no near-syncope, no neck pain, no neck stiffness, no numbness, no paresthesias, no seizures, no sore throat, no swollen glands, no syncope, no tingling, no URI, no visual change, no vomiting and no weakness    Risk factors: no anger, no family hx of SAH, does not have insomnia and lifestyle not sedentary        REVIEW OF SYSTEMS     Review of Systems   Constitutional: Negative for activity change, appetite change, chills, diaphoresis, fatigue and fever. HENT: Positive for congestion, rhinorrhea and sinus pressure. Negative for ear pain, hearing loss, postnasal drip and sore throat. Eyes: Positive for photophobia and pain.  Negative

## 2020-05-14 ENCOUNTER — CARE COORDINATION (OUTPATIENT)
Dept: CARE COORDINATION | Age: 45
End: 2020-05-14

## 2020-05-14 NOTE — CARE COORDINATION
Declined Loop enrollment    Patient contacted regarding recent discharge and COVID-19 risk   Care Transition Nurse/ Ambulatory Care Manager contacted the patient by telephone to perform post discharge assessment. Verified name and  with patient as identifiers. Patient has following risk factors of: UC visit. CTN/ACM reviewed discharge instructions, medical action plan and red flags related to discharge diagnosis. Reviewed and educated them on any new and changed medications related to discharge diagnosis. Advised obtaining a 90-day supply of all daily and as-needed medications. Education provided regarding infection prevention, and signs and symptoms of COVID-19 and when to seek medical attention with patient who verbalized understanding. Discussed exposure protocols and quarantine from [address] you at higher risk for severe illness  and given an opportunity for questions and concerns. The patient agrees to contact the COVID-19 hotline [phone] or PCP office for questions related to their healthcare. CTN/ACM provided contact information for future reference. From CDC: Are you at higher risk for severe illness?  Wash your hands often.  Avoid close contact (6 feet, which is about two arm lengths) with people who are sick.  Put distance between yourself and other people if COVID-19 is spreading in your community.  Clean and disinfect frequently touched surfaces.  Avoid all cruise travel and non-essential air travel.  Call your healthcare professional if you have concerns about COVID-19 and your underlying condition or if you are sick. For more information on steps you can take to protect yourself, see CDC's How to [id number] [place] [address] for follow-up call in 7-14 days based on severity of symptoms and risk factors.

## 2020-05-26 ENCOUNTER — HOSPITAL ENCOUNTER (EMERGENCY)
Age: 45
Discharge: HOME | End: 2020-05-26
Payer: MEDICAID

## 2020-05-26 VITALS
BODY MASS INDEX: 25.92 KG/M2 | SYSTOLIC BLOOD PRESSURE: 132 MMHG | OXYGEN SATURATION: 97 % | TEMPERATURE: 99.5 F | DIASTOLIC BLOOD PRESSURE: 85 MMHG | HEIGHT: 69 IN | HEART RATE: 85 BPM | WEIGHT: 175 LBS | RESPIRATION RATE: 16 BRPM

## 2020-05-26 LAB — GLUCOSE BLD-MCNC: 106 MG/DL (ref 70–108)

## 2020-05-26 PROCEDURE — 99212 OFFICE O/P EST SF 10 MIN: CPT | Performed by: NURSE PRACTITIONER

## 2020-05-26 PROCEDURE — 99215 OFFICE O/P EST HI 40 MIN: CPT

## 2020-05-26 PROCEDURE — 82948 REAGENT STRIP/BLOOD GLUCOSE: CPT

## 2020-05-26 ASSESSMENT — ENCOUNTER SYMPTOMS
EYE WATERING: 0
BLIND SPOTS: 0
SORE THROAT: 0
DOUBLE VISION: 0
EYE PAIN: 0
EYE INFLAMMATION: 0
PHOTOPHOBIA: 0
EYE REDNESS: 0
BACK PAIN: 0
VOMITING: 0
CRUSTING: 0
COUGH: 0
SHORTNESS OF BREATH: 0
BLURRED VISION: 1
NAUSEA: 0
EYE DISCHARGE: 0
CHEST TIGHTNESS: 0
EYE ITCHING: 0

## 2020-05-26 ASSESSMENT — VISUAL ACUITY
OU: 1
OD: 20/25

## 2020-05-26 NOTE — ED TRIAGE NOTES
Right eye  Pupil reactive  To light. States no change is vision since  Entering the building. Still blurry.

## 2020-05-26 NOTE — ED PROVIDER NOTES
Cardiovascular:      Rate and Rhythm: Normal rate. Pulmonary:      Effort: Pulmonary effort is normal.   Skin:     General: Skin is warm and dry. Capillary Refill: Capillary refill takes less than 2 seconds. Neurological:      Mental Status: He is alert and oriented to person, place, and time. Sensory: No sensory deficit. Psychiatric:         Mood and Affect: Mood normal.         Behavior: Behavior normal. Behavior is cooperative. DIAGNOSTIC RESULTS     Labs:  Results for orders placed or performed during the hospital encounter of 05/26/20   POCT Glucose   Result Value Ref Range    POC Glucose 106 70 - 108 mg/dl       IMAGING:    No orders to display         EKG:      URGENT CARE COURSE:     Vitals:    05/26/20 1349   BP: 132/85   Pulse: 85   Resp: 16   Temp: 99.5 °F (37.5 °C)   SpO2: 97%   Weight: 175 lb (79.4 kg)   Height: 5' 9\" (1.753 m)       Medications - No data to display         PROCEDURES:  None    FINAL IMPRESSION      1. Visual disturbance of one eye          DISPOSITION/ PLAN     I did discuss the case with ophthalmology office of Dr. Car Erwin. They stated they would be able to see the patient if the patient came directly over. The patient will leave here and go directly for ophthalmology for a thorough eye examination. The patient is agreeable to the treatment plan and the patient left ambulatory with his girlfriend to see ophthalmology for further management of care.       PATIENT REFERRED TO:  MARK Mason CNP  [address] [facility]  / [place] [address]      DISCHARGE MEDICATIONS:  Discharge Medication List as of 5/26/2020  2:25 PM          Discharge Medication List as of 5/26/2020  2:25 PM          Discharge Medication List as of 5/26/2020  2:25 PM          MARK Alvarenga CNP    (Please note that portions of this note were completed with a voice recognition program. Efforts were made to edit the dictations but occasionally words are mis-transcribed.) Ellen Jung, MARK - CNP  05/26/20 5600

## 2020-05-26 NOTE — ED NOTES
Patient understood instructions verbally,  Follow up with Dr. Shamika Huff office now for recheck eye. ambulated self to lobby,stable condition.       Ale Real, DAYANARA  39/58/64 7121

## 2020-05-27 ENCOUNTER — TELEPHONE (OUTPATIENT)
Dept: FAMILY MEDICINE CLINIC | Age: 45
End: 2020-05-27

## 2020-05-28 ENCOUNTER — CARE COORDINATION (OUTPATIENT)
Dept: CARE COORDINATION | Age: 45
End: 2020-05-28

## 2020-06-10 ENCOUNTER — HOSPITAL ENCOUNTER (EMERGENCY)
Age: 45
Discharge: HOME | End: 2020-06-10
Payer: MEDICAID

## 2020-06-10 VITALS
OXYGEN SATURATION: 98 % | SYSTOLIC BLOOD PRESSURE: 115 MMHG | WEIGHT: 175 LBS | DIASTOLIC BLOOD PRESSURE: 83 MMHG | HEART RATE: 75 BPM | HEIGHT: 69 IN | BODY MASS INDEX: 25.92 KG/M2 | TEMPERATURE: 99.2 F | RESPIRATION RATE: 18 BRPM

## 2020-06-10 LAB
ANION GAP SERPL CALCULATED.3IONS-SCNC: 8 MEQ/L (ref 8–16)
BASOPHILS # BLD: 1.2 %
BASOPHILS ABSOLUTE: 0.1 THOU/MM3 (ref 0–0.1)
BUN BLDV-MCNC: 11 MG/DL (ref 7–22)
CALCIUM SERPL-MCNC: 9.2 MG/DL (ref 8.5–10.5)
CHLORIDE BLD-SCNC: 106 MEQ/L (ref 98–111)
CO2: 23 MEQ/L (ref 23–33)
CREAT SERPL-MCNC: 0.9 MG/DL (ref 0.4–1.2)
EKG ATRIAL RATE: 71 BPM
EKG P AXIS: 69 DEGREES
EKG P-R INTERVAL: 156 MS
EKG Q-T INTERVAL: 390 MS
EKG QRS DURATION: 102 MS
EKG QTC CALCULATION (BAZETT): 423 MS
EKG R AXIS: 74 DEGREES
EKG T AXIS: 48 DEGREES
EKG VENTRICULAR RATE: 71 BPM
EOSINOPHIL # BLD: 3.8 %
EOSINOPHILS ABSOLUTE: 0.2 THOU/MM3 (ref 0–0.4)
ERYTHROCYTE [DISTWIDTH] IN BLOOD BY AUTOMATED COUNT: 13.1 % (ref 11.5–14.5)
ERYTHROCYTE [DISTWIDTH] IN BLOOD BY AUTOMATED COUNT: 46.5 FL (ref 35–45)
GFR SERPL CREATININE-BSD FRML MDRD: > 90 ML/MIN/1.73M2
GLUCOSE BLD-MCNC: 110 MG/DL (ref 70–108)
HCT VFR BLD CALC: 44.2 % (ref 42–52)
HEMOGLOBIN: 14.7 GM/DL (ref 14–18)
IMMATURE GRANS (ABS): 0.01 THOU/MM3 (ref 0–0.07)
IMMATURE GRANULOCYTES: 0.2 %
LYMPHOCYTES # BLD: 24.1 %
LYMPHOCYTES ABSOLUTE: 1.4 THOU/MM3 (ref 1–4.8)
MCH RBC QN AUTO: 32 PG (ref 26–33)
MCHC RBC AUTO-ENTMCNC: 33.3 GM/DL (ref 32.2–35.5)
MCV RBC AUTO: 96.1 FL (ref 80–94)
MONOCYTES # BLD: 5 %
MONOCYTES ABSOLUTE: 0.3 THOU/MM3 (ref 0.4–1.3)
NUCLEATED RED BLOOD CELLS: 0 /100 WBC
OSMOLALITY CALCULATION: 273.9 MOSMOL/KG (ref 275–300)
PLATELET # BLD: 296 THOU/MM3 (ref 130–400)
PMV BLD AUTO: 8.7 FL (ref 9.4–12.4)
POTASSIUM REFLEX MAGNESIUM: 4 MEQ/L (ref 3.5–5.2)
RBC # BLD: 4.6 MILL/MM3 (ref 4.7–6.1)
SEG NEUTROPHILS: 65.7 %
SEGMENTED NEUTROPHILS ABSOLUTE COUNT: 3.9 THOU/MM3 (ref 1.8–7.7)
SODIUM BLD-SCNC: 137 MEQ/L (ref 135–145)
TROPONIN T: < 0.01 NG/ML
TROPONIN T: < 0.01 NG/ML
WBC # BLD: 6 THOU/MM3 (ref 4.8–10.8)

## 2020-06-10 PROCEDURE — 2580000003 HC RX 258: Performed by: NURSE PRACTITIONER

## 2020-06-10 PROCEDURE — 6360000002 HC RX W HCPCS: Performed by: NURSE PRACTITIONER

## 2020-06-10 PROCEDURE — 36415 COLL VENOUS BLD VENIPUNCTURE: CPT

## 2020-06-10 PROCEDURE — 93005 ELECTROCARDIOGRAM TRACING: CPT | Performed by: NURSE PRACTITIONER

## 2020-06-10 PROCEDURE — 96361 HYDRATE IV INFUSION ADD-ON: CPT

## 2020-06-10 PROCEDURE — 80048 BASIC METABOLIC PNL TOTAL CA: CPT

## 2020-06-10 PROCEDURE — 85025 COMPLETE CBC W/AUTO DIFF WBC: CPT

## 2020-06-10 PROCEDURE — 96374 THER/PROPH/DIAG INJ IV PUSH: CPT

## 2020-06-10 PROCEDURE — 99285 EMERGENCY DEPT VISIT HI MDM: CPT

## 2020-06-10 PROCEDURE — 84484 ASSAY OF TROPONIN QUANT: CPT

## 2020-06-10 RX ORDER — 0.9 % SODIUM CHLORIDE 0.9 %
1000 INTRAVENOUS SOLUTION INTRAVENOUS ONCE
Status: COMPLETED | OUTPATIENT
Start: 2020-06-10 | End: 2020-06-10

## 2020-06-10 RX ORDER — ONDANSETRON 2 MG/ML
4 INJECTION INTRAMUSCULAR; INTRAVENOUS ONCE
Status: COMPLETED | OUTPATIENT
Start: 2020-06-10 | End: 2020-06-10

## 2020-06-10 RX ORDER — ONDANSETRON 4 MG/1
4 TABLET, ORALLY DISINTEGRATING ORAL EVERY 8 HOURS PRN
Qty: 20 TABLET | Refills: 0 | Status: SHIPPED | OUTPATIENT
Start: 2020-06-10 | End: 2021-09-16 | Stop reason: ALTCHOICE

## 2020-06-10 RX ADMIN — ONDANSETRON 4 MG: 2 INJECTION INTRAMUSCULAR; INTRAVENOUS at 08:38

## 2020-06-10 RX ADMIN — SODIUM CHLORIDE 1000 ML: 9 INJECTION, SOLUTION INTRAVENOUS at 08:41

## 2020-06-10 ASSESSMENT — ENCOUNTER SYMPTOMS
ABDOMINAL PAIN: 0
TROUBLE SWALLOWING: 0
DIARRHEA: 0
ABDOMINAL DISTENTION: 0
WHEEZING: 0
PHOTOPHOBIA: 0
BACK PAIN: 0
VOMITING: 0
RHINORRHEA: 0
NAUSEA: 1
CHEST TIGHTNESS: 0
FACIAL SWELLING: 0
COLOR CHANGE: 0
SORE THROAT: 0
SHORTNESS OF BREATH: 0
CONSTIPATION: 0
APNEA: 0
SINUS PRESSURE: 0
COUGH: 0
SINUS PAIN: 0

## 2020-06-10 NOTE — ED NOTES
Pt reassessed at this time. States that nausea has subsided. Resting on cot with eyes closed. Vitals stable. Call light in reach. Will continue to monitor.       Maritza Ramírez RN  06/10/20 [id number]

## 2020-06-10 NOTE — ED PROVIDER NOTES
Musculoskeletal: Normal range of motion. General: No swelling, tenderness, deformity or signs of injury. Right lower leg: No edema. Left lower leg: No edema. Skin:     General: Skin is warm and dry. Capillary Refill: Capillary refill takes less than 2 seconds. Coloration: Skin is not jaundiced or pale. Neurological:      General: No focal deficit present. Mental Status: He is alert and oriented to person, place, and time. Mental status is at baseline. GCS: GCS eye subscore is 4. GCS verbal subscore is 5. GCS motor subscore is 6. Cranial Nerves: Cranial nerves are intact. Sensory: Sensation is intact. Psychiatric:         Mood and Affect: Mood normal.         Behavior: Behavior normal. Behavior is cooperative. Thought Content: Thought content normal.         Judgment: Judgment normal.          DIFFERENTIAL DIAGNOSIS:   Nausea, acute coronary syndrome, chemical exposure, dehydration    DIAGNOSTIC RESULTS     EKG: All EKG's are interpreted by the Emergency Department Physician who either signs or Co-signs this chart in the absence of a cardiologist.    Normal sinus rhythm with rate of 71, OH of 156, QRS of 102, QTc of 423.   Compared with EKG from January 22, 2018 with no significant changes    KG interpreted by ED physician    RADIOLOGY: non-plainfilm images(s) such as CT, Ultrasound and MRI are read by the radiologist.    No orders to display       LABS:     Labs Reviewed   CBC WITH AUTO DIFFERENTIAL - Abnormal; Notable for the following components:       Result Value    RBC 4.60 (*)     MCV 96.1 (*)     RDW-SD 46.5 (*)     MPV 8.7 (*)     Monocytes Absolute 0.3 (*)     All other components within normal limits   BASIC METABOLIC PANEL W/ REFLEX TO MG FOR LOW K - Abnormal; Notable for the following components:    Glucose 110 (*)     All other components within normal limits   OSMOLALITY - Abnormal; Notable for the following components:    Osmolality Calc 273.9 (*)     All other components within normal limits   TROPONIN   ANION GAP   GLOMERULAR FILTRATION RATE, ESTIMATED   TROPONIN       EMERGENCY DEPARTMENT COURSE:   Vitals:    Vitals:    06/10/20 0820 06/10/20 0906 06/10/20 0946 06/10/20 1044   BP: (!) 140/81 126/88 121/85 115/83   Pulse: 70 69 60 75   Resp: 12 12 12 18   Temp: 99.2 °F (37.3 °C)      TempSrc: Oral      SpO2: 99% 99% 98% 98%   Weight: 175 lb (79.4 kg)      Height: 5' 9\" (1.753 m)          8:37 AM EDT: The patient was seen and evaluated. MDM:  Patient seen and evaluated for left arm pain and nausea while working. My initial physical exam patient was in no acute distress rating pain is 3 out of 10 states that it is nearly resolved, voices more concerned over the nausea that he developed. Was told to come in by his employer. Appropriate labs, EKG, imaging were completed, normal sinus rhythm on EKG. Troponins negative x2. Patient's nausea was treated and resolved completely. I discussed all findings with patient, he was amenable to discharge with outpatient follow-up with his PCP. Encouraged to return if symptoms become severe. All questions were answered and he departed the ED in stable condition    CRITICAL CARE:   None    CONSULTS:  None    PROCEDURES:  None    FINAL IMPRESSION      1. Nausea    2. Left arm pain    3.  Dehydration          DISPOSITION/PLAN   Discharge    PATIENT REFERRED TO:  Mindy Fox, APRN - [facility]  [address] [place]  [phone]    Schedule an appointment as soon as possible for a visit   As needed      DISCHARGE MEDICATIONS:  Discharge Medication List as of 6/10/2020 11:49 AM      START taking these medications    Details   ondansetron (ZOFRAN ODT) 4 MG disintegrating tablet Take 1 tablet by mouth every 8 hours as needed for Nausea, Disp-20 tablet, R-0Print             (Please note that portions of this note were completed with a voice recognition program.  Efforts were made to edit the dictations

## 2020-06-11 ENCOUNTER — CARE COORDINATION (OUTPATIENT)
Dept: CARE COORDINATION | Age: 45
End: 2020-06-11

## 2020-06-11 PROCEDURE — 93010 ELECTROCARDIOGRAM REPORT: CPT | Performed by: INTERNAL MEDICINE

## 2020-06-11 NOTE — CARE COORDINATION
Made appt with PCP  Declined Loop enrollment    Patient contacted regarding recent discharge and COVID-19 risk. Discussed COVID-19 related testing which was not done at this time. Test results were not done. Patient informed of results, if available? No     Care Transition Nurse/ Ambulatory Care Manager contacted the patient by telephone to perform post discharge assessment. Verified name and  with patient as identifiers. Patient has following risk factors of: other chronic conditions. CTN/ACM reviewed discharge instructions, medical action plan and red flags related to discharge diagnosis. Reviewed and educated them on any new and changed medications related to discharge diagnosis. Advised obtaining a 90-day supply of all daily and as-needed medications. Education provided regarding infection prevention, and signs and symptoms of COVID-19 and when to seek medical attention with patient who verbalized understanding. Discussed exposure protocols and quarantine from [address] you at higher risk for severe illness  and given an opportunity for questions and concerns. The patient agrees to contact the COVID-19 hotline [phone] or PCP office for questions related to their healthcare. CTN/ACM provided contact information for future reference. From CDC: Are you at higher risk for severe illness?  Wash your hands often.  Avoid close contact (6 feet, which is about two arm lengths) with people who are sick.  Put distance between yourself and other people if COVID-19 is spreading in your community.  Clean and disinfect frequently touched surfaces.  Avoid all cruise travel and non-essential air travel.  Call your healthcare professional if you have concerns about COVID-19 and your underlying condition or if you are sick.     For more information on steps you can take to protect yourself, see CDC's How to [address] [place] [address] for follow-up call in 7-14 days based on severity of

## 2020-06-12 ENCOUNTER — VIRTUAL VISIT (OUTPATIENT)
Dept: FAMILY MEDICINE CLINIC | Age: 45
End: 2020-06-12
Payer: MEDICAID

## 2020-06-12 PROCEDURE — 99213 OFFICE O/P EST LOW 20 MIN: CPT | Performed by: NURSE PRACTITIONER

## 2020-06-12 PROCEDURE — G8428 CUR MEDS NOT DOCUMENT: HCPCS | Performed by: NURSE PRACTITIONER

## 2020-06-12 PROCEDURE — 4004F PT TOBACCO SCREEN RCVD TLK: CPT | Performed by: NURSE PRACTITIONER

## 2020-06-12 PROCEDURE — G8417 CALC BMI ABV UP PARAM F/U: HCPCS | Performed by: NURSE PRACTITIONER

## 2020-06-12 ASSESSMENT — ENCOUNTER SYMPTOMS
EYE PAIN: 0
WHEEZING: 0
EYE REDNESS: 0
ABDOMINAL PAIN: 0
SORE THROAT: 0
COUGH: 0
VOMITING: 0
TROUBLE SWALLOWING: 0
SHORTNESS OF BREATH: 0
DIARRHEA: 0
RHINORRHEA: 0
COLOR CHANGE: 0
NAUSEA: 0

## 2020-06-25 ENCOUNTER — CARE COORDINATION (OUTPATIENT)
Dept: CARE COORDINATION | Age: 45
End: 2020-06-25

## 2020-11-09 ENCOUNTER — TELEPHONE (OUTPATIENT)
Dept: FAMILY MEDICINE CLINIC | Age: 45
End: 2020-11-09

## 2020-11-09 NOTE — TELEPHONE ENCOUNTER
Future Appointments   Date Time Provider Elvin Rudolph   11/10/2020  8:20 AM Claude Members, APRN - CNP [facility] - [address] [facility]   11/16/2020  8:00 AM Mir Ugalde MD [address] [facility] - [address] [facility]

## 2020-11-09 NOTE — TELEPHONE ENCOUNTER
Pt states he has been directly in contact with people who have tested positive for COVID. Pt is experiencing a HA, cough, body aches. Pt is asking for a COVID testing.       Please fax order to [address] at [phone]

## 2020-11-10 ENCOUNTER — HOSPITAL ENCOUNTER (OUTPATIENT)
Age: 45
Setting detail: SPECIMEN
Discharge: HOME | End: 2020-11-10
Payer: MEDICAID

## 2020-11-10 ENCOUNTER — VIRTUAL VISIT (OUTPATIENT)
Dept: FAMILY MEDICINE CLINIC | Age: 45
End: 2020-11-10
Payer: MEDICAID

## 2020-11-10 PROCEDURE — 99214 OFFICE O/P EST MOD 30 MIN: CPT | Performed by: NURSE PRACTITIONER

## 2020-11-10 PROCEDURE — G8428 CUR MEDS NOT DOCUMENT: HCPCS | Performed by: NURSE PRACTITIONER

## 2020-11-10 PROCEDURE — U0003 INFECTIOUS AGENT DETECTION BY NUCLEIC ACID (DNA OR RNA); SEVERE ACUTE RESPIRATORY SYNDROME CORONAVIRUS 2 (SARS-COV-2) (CORONAVIRUS DISEASE [COVID-19]), AMPLIFIED PROBE TECHNIQUE, MAKING USE OF HIGH THROUGHPUT TECHNOLOGIES AS DESCRIBED BY CMS-2020-01-R: HCPCS

## 2020-11-10 RX ORDER — HYDROCHLOROTHIAZIDE 12.5 MG/1
12.5 TABLET ORAL DAILY
Qty: 90 TABLET | Refills: 3 | Status: SHIPPED | OUTPATIENT
Start: 2020-11-10 | End: 2021-11-09 | Stop reason: SDUPTHER

## 2020-11-10 RX ORDER — AMLODIPINE BESYLATE 10 MG/1
10 TABLET ORAL DAILY
Qty: 90 TABLET | Refills: 3 | Status: SHIPPED | OUTPATIENT
Start: 2020-11-10 | End: 2021-11-09 | Stop reason: SDUPTHER

## 2020-11-10 ASSESSMENT — ENCOUNTER SYMPTOMS
DIARRHEA: 0
VOMITING: 0
SORE THROAT: 0
WHEEZING: 0
SHORTNESS OF BREATH: 0
ABDOMINAL PAIN: 0
COLOR CHANGE: 0
RHINORRHEA: 1
TROUBLE SWALLOWING: 0
NAUSEA: 0
EYE PAIN: 0
COUGH: 1
EYE REDNESS: 0

## 2020-11-10 ASSESSMENT — PATIENT HEALTH QUESTIONNAIRE - PHQ9
SUM OF ALL RESPONSES TO PHQ QUESTIONS 1-9: 0
2. FEELING DOWN, DEPRESSED OR HOPELESS: 0
SUM OF ALL RESPONSES TO PHQ9 QUESTIONS 1 & 2: 0
1. LITTLE INTEREST OR PLEASURE IN DOING THINGS: 0

## 2020-11-10 NOTE — LETTER
[address] [place] [address]  [address] [place] [address]. [facility] [address]  Phone: [phone]  Fax: [address] [facility], APRN - CNP        November 10, 2020     Patient: Joceline Estrada   YOB: 1975   Date of Visit: 11/10/2020       To Whom it May Concern:    Jorge Mcallister was seen in my clinic on 11/10/2020. He may return to work on 11/16/20. If you have any questions or concerns, please don't hesitate to call.     Sincerely,         Zain Mars, MARK - CNP

## 2020-11-10 NOTE — PROGRESS NOTES
11/10/2020    TELEHEALTH EVALUATION -- Audio/Visual (During EMWQS-28 public health emergency)    HPI:    Chau Branch (:  1975) has requested an audio/video evaluation for the following concern(s):    URI Symptoms    HPI:      Symptoms have been present for 5 day(s). Symptoms are worse since they initially started. Fever? No  Runny nose or congestion? Yes   Cough? Yes  Sore throat? No  Headache, fatigue, joint pains, muscle aches? Yes - weak, HA and body aches  Shortness of breath/Wheezing? No  Nausea/Vomiting/Diarrhea? Yes - some nausea  Double Sickening? No  Sick contacts? Yes - close exposure to COVID    Patient has tried Coricidin HBP with improvement. HTN    Does patient check BP regularly at home? - Yes - 120/80  Current Medication regimen - HCTZ, Norvasc  Tolerating medications well? - yes    Shortness of breath or chest pain? No  Headache or visual complaints? Yes - some headaches, but also sick recently  Neurologic changes like confusion? No  Extremity edema? No    BP Readings from Last 3 Encounters:   06/10/20 115/83   20 132/85   20 132/86       Review of Systems   Constitutional: Positive for fatigue. Negative for chills, diaphoresis and fever. HENT: Positive for congestion, postnasal drip and rhinorrhea. Negative for sore throat and trouble swallowing. Eyes: Negative for pain, redness and visual disturbance. Respiratory: Positive for cough. Negative for shortness of breath and wheezing. Cardiovascular: Negative for chest pain, palpitations and leg swelling. Gastrointestinal: Negative for abdominal pain, diarrhea, nausea and vomiting. Endocrine: Negative for polydipsia, polyphagia and polyuria. Genitourinary: Negative for decreased urine volume, dysuria, frequency and urgency. Musculoskeletal: Positive for arthralgias and myalgias. Skin: Negative for color change and rash.    Allergic/Immunologic: Negative for environmental allergies, food allergies and immunocompromised state. Neurological: Positive for weakness and headaches. Negative for dizziness, tremors and syncope. Hematological: Negative. Negative for adenopathy. Psychiatric/Behavioral: Negative for behavioral problems, confusion, self-injury and suicidal ideas. All other systems reviewed and are negative. Prior to Visit Medications    Medication Sig Taking? Authorizing Provider   amLODIPine (NORVASC) 10 MG tablet Take 1 tablet by mouth daily Yes MARK Dobbins CNP   hydroCHLOROthiazide (HYDRODIURIL) 12.5 MG tablet Take 1 tablet by mouth daily Yes MARK Dobbins CNP   ondansetron (ZOFRAN ODT) 4 MG disintegrating tablet Take 1 tablet by mouth every 8 hours as needed for Nausea  MARK Tao CNP   omeprazole (PRILOSEC) 10 MG delayed release capsule Take 10 mg by mouth daily  Historical Provider, MD   acetaminophen (TYLENOL) 500 MG tablet Take 1,000 mg by mouth every 6 hours as needed for Pain  Historical Provider, MD   azelastine (ASTELIN) 0.1 % nasal spray 1 spray by Nasal route 2 times daily Use in each nostril as directed  MARK Hernandez CNP   Elastic Bandages & Supports (JOBST KNEE HIGH COMPRESSION SM) MISC 1 each by Does not apply route daily as needed (edema)  MARK Hernandez CNP   tolnaftate (TINACTIN) 1 % powder Apply topically 2 times daily. MARK Dobbins CNP   Multiple Vitamin (MULTI VITAMIN DAILY PO) Take by mouth  Historical Provider, MD   aspirin (ASPIRIN CHILDRENS) 81 MG chewable tablet Take 1 tablet by mouth daily  Lois Mazariegos MD       Social History     Tobacco Use    Smoking status: Current Every Day Smoker     Packs/day: 0.50     Years: 22.00     Pack years: 11.00     Types: Cigarettes    Smokeless tobacco: Never Used   Substance Use Topics    Alcohol use:  Yes     Alcohol/week: 24.0 standard drinks     Types: 24 Cans of beer per week     Comment: socially    Drug use: No        No Known Allergies,   Past Medical History:   Diagnosis Date    Blind     left eye    Essential hypertension 6/21/2017    Gastroesophageal reflux disease without esophagitis 1/29/2018    Gastroesophageal reflux disease without esophagitis 1/29/2018    Hypertension     10/7/2015    Tobacco use disorder 1/23/2018   ,   Past Surgical History:   Procedure Laterality Date    EYE SURGERY      Glass eye placement, left eye.    ,   Family History   Problem Relation Age of Onset    Heart Disease Father     High Blood Pressure Father     High Blood Pressure Mother    ,   Immunization History   Administered Date(s) Administered    Influenza Virus Vaccine 10/08/2015    Pneumococcal Polysaccharide ([id number]) 10/08/2015   ,   Health Maintenance   Topic Date Due    HIV screen  05/17/1991    DTaP/Tdap/Td vaccine (1 - Tdap) 05/17/1995    Diabetes screen  05/17/2016    Flu vaccine (1) 09/01/2020    Lipid screen  10/08/2020    Potassium monitoring  06/10/2021    Creatinine monitoring  06/10/2021    Pneumococcal 0-64 years Vaccine  Completed    Hepatitis A vaccine  Aged Out    Hepatitis B vaccine  Aged Out    Hib vaccine  Aged Out    Meningococcal (ACWY) vaccine  Aged Out       PHYSICAL EXAMINATION:  [ INSTRUCTIONS:  \"[x]\" Indicates a positive item  \"[]\" Indicates a negative item  -- DELETE ALL ITEMS NOT EXAMINED]  Vital Signs: (As obtained by patient/caregiver or practitioner observation)    Blood pressure-  Heart rate-    Respiratory rate-    Temperature-  Pulse oximetry-     Constitutional: [x] Appears well-developed and well-nourished [x] No apparent distress      [] Abnormal-   Mental status  [x] Alert and awake  [x] Oriented to person/place/time [x]Able to follow commands      Eyes:  EOM    [x]  Normal  [] Abnormal-  Sclera  [x]  Normal  [] Abnormal -         Discharge [x]  None visible  [] Abnormal -    HENT:   [x] Normocephalic, atraumatic.   [] Abnormal   [x] Mouth/Throat: Mucous membranes are moist.     External Ears [x] Normal  [] Abnormal-     Neck: [x] No visualized mass     Pulmonary/Chest: [x] Respiratory effort normal.  [x] No visualized signs of difficulty breathing or respiratory distress        [] Abnormal-      Musculoskeletal:   [x] Normal gait with no signs of ataxia         [x] Normal range of motion of neck        [] Abnormal-       Neurological:        [x] No Facial Asymmetry (Cranial nerve 7 motor function) (limited exam to video visit)          [x] No gaze palsy        [] Abnormal-         Skin:        [x] No significant exanthematous lesions or discoloration noted on facial skin         [] Abnormal-            Psychiatric:       [x] Normal Affect [x] No Hallucinations        [] Abnormal-     Other pertinent observable physical exam findings-     ASSESSMENT/PLAN:  1. Cough with exposure to COVID-19 virus  - work slip  - self quarantine  - con't OTC medications of choice  - COVID-19 Ambulatory; Future    2. Essential hypertension  - BP reasonable control, con't Norvasc and HCTZ  - amLODIPine (NORVASC) 10 MG tablet; Take 1 tablet by mouth daily  Dispense: 90 tablet; Refill: 3  - hydroCHLOROthiazide (HYDRODIURIL) 12.5 MG tablet; Take 1 tablet by mouth daily  Dispense: 90 tablet; Refill: 3      Return if symptoms worsen or fail to improve. Hazel Zhang is a 41 y.o. male being evaluated by a Virtual Visit (video visit) encounter to address concerns as mentioned above. A caregiver was present when appropriate. Due to this being a TeleHealth encounter (During ZBZ-85 public health emergency), evaluation of the following organ systems was limited: Vitals/Constitutional/EENT/Resp/CV/GI//MS/Neuro/Skin/Heme-Lymph-Imm.   Pursuant to the emergency declaration under the [facility] waiver authority and the [employer] Act, this Virtual Visit was conducted with patient's (and/or legal guardian's) consent, to reduce the patient's risk of exposure to COVID-19 and provide necessary medical care. The patient (and/or legal guardian) has also been advised to contact this office for worsening conditions or problems, and seek emergency medical treatment and/or call 911 if deemed necessary. Services were provided through a video synchronous discussion virtually to substitute for in-person clinic visit. Patient and provider were located at their individual homes. --MARK Spicer CNP on 11/10/2020 at 8:33 AM    An electronic signature was used to authenticate this note.

## 2020-11-11 LAB — SARS-COV-2: NOT DETECTED

## 2020-11-12 ENCOUNTER — TELEPHONE (OUTPATIENT)
Dept: FAMILY MEDICINE CLINIC | Age: 45
End: 2020-11-12

## 2020-11-12 RX ORDER — AZITHROMYCIN 250 MG/1
250 TABLET, FILM COATED ORAL SEE ADMIN INSTRUCTIONS
Qty: 6 TABLET | Refills: 0 | Status: SHIPPED | OUTPATIENT
Start: 2020-11-12 | End: 2020-11-17

## 2020-11-12 NOTE — TELEPHONE ENCOUNTER
Pt informed. Still having sinus congestion, headache, and fatigue. Feeling no better than 11/10/20 VV. Pt taking OTC Coricidin which is helping some. Any further suggestions?

## 2020-11-12 NOTE — TELEPHONE ENCOUNTER
----- Message from MARK Liriano - CNP sent at 11/12/2020  8:20 AM EST -----  Let Boris Wilson know his COVID test was negative. Is he feeling any better?

## 2021-03-09 ENCOUNTER — HOSPITAL ENCOUNTER (EMERGENCY)
Age: 46
Discharge: HOME | End: 2021-03-09
Attending: EMERGENCY MEDICINE
Payer: MEDICAID

## 2021-03-09 ENCOUNTER — APPOINTMENT (OUTPATIENT)
Dept: GENERAL RADIOLOGY | Age: 46
End: 2021-03-09
Payer: MEDICAID

## 2021-03-09 ENCOUNTER — VIRTUAL VISIT (OUTPATIENT)
Dept: FAMILY MEDICINE CLINIC | Age: 46
End: 2021-03-09
Payer: MEDICAID

## 2021-03-09 VITALS
HEART RATE: 80 BPM | HEIGHT: 69 IN | OXYGEN SATURATION: 98 % | TEMPERATURE: 97.8 F | SYSTOLIC BLOOD PRESSURE: 135 MMHG | BODY MASS INDEX: 26.66 KG/M2 | WEIGHT: 180 LBS | DIASTOLIC BLOOD PRESSURE: 80 MMHG | RESPIRATION RATE: 16 BRPM

## 2021-03-09 DIAGNOSIS — R07.9 CHEST PAIN, UNSPECIFIED TYPE: ICD-10-CM

## 2021-03-09 DIAGNOSIS — R42 DIZZINESS: Primary | ICD-10-CM

## 2021-03-09 DIAGNOSIS — R07.9 LEFT-SIDED CHEST PAIN: ICD-10-CM

## 2021-03-09 DIAGNOSIS — R42 LIGHTHEADEDNESS: Primary | ICD-10-CM

## 2021-03-09 LAB
ALBUMIN SERPL-MCNC: 4.6 G/DL (ref 3.5–5.1)
ALP BLD-CCNC: 54 U/L (ref 38–126)
ALT SERPL-CCNC: 21 U/L (ref 11–66)
ANION GAP SERPL CALCULATED.3IONS-SCNC: 10 MEQ/L (ref 8–16)
AST SERPL-CCNC: 15 U/L (ref 5–40)
BASOPHILS # BLD: 0.9 %
BASOPHILS ABSOLUTE: 0.1 THOU/MM3 (ref 0–0.1)
BILIRUB SERPL-MCNC: 0.5 MG/DL (ref 0.3–1.2)
BUN BLDV-MCNC: 14 MG/DL (ref 7–22)
CALCIUM SERPL-MCNC: 9.6 MG/DL (ref 8.5–10.5)
CHLORIDE BLD-SCNC: 102 MEQ/L (ref 98–111)
CO2: 26 MEQ/L (ref 23–33)
CREAT SERPL-MCNC: 0.7 MG/DL (ref 0.4–1.2)
EKG ATRIAL RATE: 85 BPM
EKG P AXIS: 70 DEGREES
EKG P-R INTERVAL: 150 MS
EKG Q-T INTERVAL: 354 MS
EKG QRS DURATION: 110 MS
EKG QTC CALCULATION (BAZETT): 421 MS
EKG R AXIS: 74 DEGREES
EKG T AXIS: 45 DEGREES
EKG VENTRICULAR RATE: 85 BPM
EOSINOPHIL # BLD: 2.4 %
EOSINOPHILS ABSOLUTE: 0.2 THOU/MM3 (ref 0–0.4)
ERYTHROCYTE [DISTWIDTH] IN BLOOD BY AUTOMATED COUNT: 12.7 % (ref 11.5–14.5)
ERYTHROCYTE [DISTWIDTH] IN BLOOD BY AUTOMATED COUNT: 44.9 FL (ref 35–45)
GFR SERPL CREATININE-BSD FRML MDRD: > 90 ML/MIN/1.73M2
GLUCOSE BLD-MCNC: 103 MG/DL (ref 70–108)
HCT VFR BLD CALC: 45.9 % (ref 42–52)
HEMOGLOBIN: 15.4 GM/DL (ref 14–18)
IMMATURE GRANS (ABS): 0.01 THOU/MM3 (ref 0–0.07)
IMMATURE GRANULOCYTES: 0.1 %
LYMPHOCYTES # BLD: 29.3 %
LYMPHOCYTES ABSOLUTE: 2.2 THOU/MM3 (ref 1–4.8)
MCH RBC QN AUTO: 32 PG (ref 26–33)
MCHC RBC AUTO-ENTMCNC: 33.6 GM/DL (ref 32.2–35.5)
MCV RBC AUTO: 95.4 FL (ref 80–94)
MONOCYTES # BLD: 6 %
MONOCYTES ABSOLUTE: 0.5 THOU/MM3 (ref 0.4–1.3)
NUCLEATED RED BLOOD CELLS: 0 /100 WBC
OSMOLALITY CALCULATION: 276.4 MOSMOL/KG (ref 275–300)
PLATELET # BLD: 314 THOU/MM3 (ref 130–400)
PMV BLD AUTO: 8.4 FL (ref 9.4–12.4)
POTASSIUM REFLEX MAGNESIUM: 4.2 MEQ/L (ref 3.5–5.2)
RBC # BLD: 4.81 MILL/MM3 (ref 4.7–6.1)
SEG NEUTROPHILS: 61.3 %
SEGMENTED NEUTROPHILS ABSOLUTE COUNT: 4.7 THOU/MM3 (ref 1.8–7.7)
SODIUM BLD-SCNC: 138 MEQ/L (ref 135–145)
TOTAL PROTEIN: 6.9 G/DL (ref 6.1–8)
TROPONIN T: < 0.01 NG/ML
WBC # BLD: 7.6 THOU/MM3 (ref 4.8–10.8)

## 2021-03-09 PROCEDURE — 36415 COLL VENOUS BLD VENIPUNCTURE: CPT

## 2021-03-09 PROCEDURE — 84484 ASSAY OF TROPONIN QUANT: CPT

## 2021-03-09 PROCEDURE — 99284 EMERGENCY DEPT VISIT MOD MDM: CPT

## 2021-03-09 PROCEDURE — 93005 ELECTROCARDIOGRAM TRACING: CPT | Performed by: STUDENT IN AN ORGANIZED HEALTH CARE EDUCATION/TRAINING PROGRAM

## 2021-03-09 PROCEDURE — 85025 COMPLETE CBC W/AUTO DIFF WBC: CPT

## 2021-03-09 PROCEDURE — 99214 OFFICE O/P EST MOD 30 MIN: CPT | Performed by: NURSE PRACTITIONER

## 2021-03-09 PROCEDURE — G8428 CUR MEDS NOT DOCUMENT: HCPCS | Performed by: NURSE PRACTITIONER

## 2021-03-09 PROCEDURE — 80053 COMPREHEN METABOLIC PANEL: CPT

## 2021-03-09 PROCEDURE — 71046 X-RAY EXAM CHEST 2 VIEWS: CPT

## 2021-03-09 ASSESSMENT — ENCOUNTER SYMPTOMS
RHINORRHEA: 0
VOMITING: 0
COLOR CHANGE: 0
ABDOMINAL PAIN: 0
SHORTNESS OF BREATH: 0
COUGH: 0
CHEST TIGHTNESS: 0
DIARRHEA: 0
RHINORRHEA: 0
NAUSEA: 0
EYE PAIN: 0
SHORTNESS OF BREATH: 0
TROUBLE SWALLOWING: 0
DIARRHEA: 0
EYE REDNESS: 0
NAUSEA: 0
ABDOMINAL PAIN: 0
COUGH: 0
SORE THROAT: 0
WHEEZING: 0

## 2021-03-09 NOTE — LETTER
[facility] [address] [address] EMERGENCY DEPT  [address] [place] [address]  Phone: [phone]             March 9, 2021    Patient: Mary Rg   YOB: 1975   Date of Visit: 3/9/2021       To Whom It May Concern:    Vanesa Hi was seen and treated in our emergency department on 3/9/2021. He may return to work on 3/10/2021.         V/O Dr. Concepcion Moses     Sincerely,             Signature:__________________________________

## 2021-03-09 NOTE — ED TRIAGE NOTES
Patient arrived to room 15 with c/o dizziness. Patient stated this started last night and he called his PCP today and told him to come here. Patient stated he feels lightheaded.

## 2021-03-09 NOTE — PROGRESS NOTES
3/9/2021    TELEHEALTH EVALUATION -- Audio/Visual (During [facility]- public health emergency)    HPI:    Bri Cordova (:  1975) has requested an audio/video evaluation for the following concern(s):    Dizziness    HPI:      Symptoms have been present for 1 day(s). Patient describes symptoms as vertigo - patient feels like he is spinning. He had an episode at work though where he felt like he was going to pass out so he went home from work. He did have some left sided chest pain last evening at work associated with the lightheadedness. Symptoms are continuous  Inciting event prior to symptoms starting? No  Provoking factos- standing up and walking  Alleviating factors - sitting still  Frequency of symptoms - see above   Duration of events -see above. Associated Symptoms -  Lightheaded and Chest pain  History of trauma - No   Nausea and vomiting - No  History of Meniers disease, BPPV, or labrinthitis - No  Medications that can cause vertigo -  No  History of alcohol consumption - No    He has been checking his BP at home and his /89    Review of Systems   Constitutional: Negative for chills, diaphoresis and fatigue. HENT: Negative for congestion, rhinorrhea, sore throat and trouble swallowing. Eyes: Negative for pain, redness and visual disturbance. Respiratory: Negative for cough, shortness of breath and wheezing. Cardiovascular: Positive for chest pain. Negative for palpitations and leg swelling. Gastrointestinal: Negative for abdominal pain, diarrhea, nausea and vomiting. Endocrine: Negative for polydipsia, polyphagia and polyuria. Genitourinary: Negative for decreased urine volume, dysuria, frequency and urgency. Musculoskeletal: Negative for arthralgias and myalgias. Skin: Negative for color change and rash. Allergic/Immunologic: Negative for environmental allergies, food allergies and immunocompromised state. Neurological: Positive for dizziness and light-headedness. Negative for tremors, syncope and headaches. Hematological: Negative for adenopathy. Psychiatric/Behavioral: Negative for behavioral problems, confusion, self-injury and suicidal ideas. All other systems reviewed and are negative. Prior to Visit Medications    Medication Sig Taking? Authorizing Provider   amLODIPine (NORVASC) 10 MG tablet Take 1 tablet by mouth daily  MARK Panchal CNP   hydroCHLOROthiazide (HYDRODIURIL) 12.5 MG tablet Take 1 tablet by mouth daily  MARK Panchal CNP   ondansetron (ZOFRAN ODT) 4 MG disintegrating tablet Take 1 tablet by mouth every 8 hours as needed for Nausea  MARK Velasco CNP   omeprazole (PRILOSEC) 10 MG delayed release capsule Take 10 mg by mouth daily  Historical Provider, MD   acetaminophen (TYLENOL) 500 MG tablet Take 1,000 mg by mouth every 6 hours as needed for Pain  Historical Provider, MD   azelastine (ASTELIN) 0.1 % nasal spray 1 spray by Nasal route 2 times daily Use in each nostril as directed  MARK Baxter CNP   Elastic Bandages & Supports (JOBST KNEE HIGH COMPRESSION SM) MISC 1 each by Does not apply route daily as needed (edema)  MARK Baxter CNP   tolnaftate (TINACTIN) 1 % powder Apply topically 2 times daily. MARK Panchal CNP   Multiple Vitamin (MULTI VITAMIN DAILY PO) Take by mouth  Historical Provider, MD   aspirin (ASPIRIN CHILDRENS) 81 MG chewable tablet Take 1 tablet by mouth daily  Belinda Marks MD       Social History     Tobacco Use    Smoking status: Current Every Day Smoker     Packs/day: 0.50     Years: 22.00     Pack years: 11.00     Types: Cigarettes    Smokeless tobacco: Never Used   Substance Use Topics    Alcohol use:  Yes     Alcohol/week: 24.0 standard drinks     Types: 24 Cans of beer per week     Comment: socially    Drug use: No        No Known Allergies,   Past Medical History:   Diagnosis Date    Blind     left eye    Essential hypertension 6/21/2017    Gastroesophageal reflux disease without esophagitis 1/29/2018    Gastroesophageal reflux disease without esophagitis 1/29/2018    Hypertension     10/7/2015    Tobacco use disorder 1/23/2018   ,   Past Surgical History:   Procedure Laterality Date    EYE SURGERY      Glass eye placement, left eye.    ,   Family History   Problem Relation Age of Onset    Heart Disease Father     High Blood Pressure Father     High Blood Pressure Mother    ,   Immunization History   Administered Date(s) Administered    Influenza Virus Vaccine 10/08/2015    Pneumococcal Polysaccharide ([id number]) 10/08/2015   ,   Health Maintenance   Topic Date Due    Hepatitis C screen  Never done    HIV screen  Never done    DTaP/Tdap/Td vaccine (1 - Tdap) Never done    Diabetes screen  Never done    Flu vaccine (1) 09/01/2020    Lipid screen  10/08/2020    Potassium monitoring  06/10/2021    Creatinine monitoring  06/10/2021    Pneumococcal 0-64 years Vaccine  Completed    Hepatitis A vaccine  Aged Out    Hepatitis B vaccine  Aged Out    Hib vaccine  Aged Out    Meningococcal (ACWY) vaccine  Aged Out       PHYSICAL EXAMINATION:  [ INSTRUCTIONS:  \"[x]\" Indicates a positive item  \"[]\" Indicates a negative item  -- DELETE ALL ITEMS NOT EXAMINED]  Vital Signs: (As obtained by patient/caregiver or practitioner observation)    Blood pressure-  Heart rate-    Respiratory rate-    Temperature-  Pulse oximetry-     Constitutional: [x] Appears well-developed and well-nourished [x] No apparent distress      [] Abnormal-   Mental status  [x] Alert and awake  [x] Oriented to person/place/time [x]Able to follow commands      Eyes:  EOM    [x]  Normal  [] Abnormal-  Sclera  [x]  Normal  [] Abnormal -         Discharge [x]  None visible  [] Abnormal -    HENT:   [x] Normocephalic, atraumatic.   [] Abnormal   [x] Mouth/Throat: Mucous membranes are moist.     External Ears [x] Normal  [] Abnormal-     Neck: [x] No visualized mass Pulmonary/Chest: [x] Respiratory effort normal.  [x] No visualized signs of difficulty breathing or respiratory distress        [] Abnormal-      Musculoskeletal:   [x] Normal gait with no signs of ataxia         [x] Normal range of motion of neck        [] Abnormal-       Neurological:        [x] No Facial Asymmetry (Cranial nerve 7 motor function) (limited exam to video visit)          [x] No gaze palsy        [] Abnormal-         Skin:        [x] No significant exanthematous lesions or discoloration noted on facial skin         [] Abnormal-            Psychiatric:       [x] Normal Affect [x] No Hallucinations        [] Abnormal-     Other pertinent observable physical exam findings-     ASSESSMENT & PLAN  Sherin December was seen today for dizziness. Diagnoses and all orders for this visit:    Lightheadedness    Left-sided chest pain      - rec'd further workup at the ER to rule out any cardiac causes  - patient agreeable and will proceed to the ER  - report called to [facility] ED    Return if symptoms worsen or fail to improve. Terence Mitchell is a 41 y.o. male being evaluated by a Virtual Visit (video visit) encounter to address concerns as mentioned above. A caregiver was present when appropriate. Due to this being a TeleHealth encounter (During [facility] public health emergency), evaluation of the following organ systems was limited: Vitals/Constitutional/EENT/Resp/CV/GI//MS/Neuro/Skin/Heme-Lymph-Imm. Pursuant to the emergency declaration under the [address] [place] [address], [address] [place] [address] authority and the [employer] Act, this Virtual Visit was conducted with patient's (and/or legal guardian's) consent, to reduce the patient's risk of exposure to COVID-19 and provide necessary medical care.   The patient (and/or legal guardian) has also been advised to contact this office for worsening conditions or problems, and seek emergency medical treatment and/or call 911 if deemed necessary. Services were provided through a video synchronous discussion virtually to substitute for in-person clinic visit. Patient and provider were located at their individual homes. --MARK Travis CNP on 3/9/2021 at 10:13 AM    An electronic signature was used to authenticate this note.

## 2021-09-16 ENCOUNTER — HOSPITAL ENCOUNTER (EMERGENCY)
Age: 46
Discharge: HOME | End: 2021-09-16
Payer: MEDICAID

## 2021-09-16 VITALS
OXYGEN SATURATION: 98 % | TEMPERATURE: 97.1 F | RESPIRATION RATE: 16 BRPM | SYSTOLIC BLOOD PRESSURE: 135 MMHG | DIASTOLIC BLOOD PRESSURE: 81 MMHG | HEART RATE: 83 BPM

## 2021-09-16 DIAGNOSIS — M77.52 CALCANEAL BURSITIS (HEEL), LEFT: Primary | ICD-10-CM

## 2021-09-16 PROCEDURE — 99213 OFFICE O/P EST LOW 20 MIN: CPT

## 2021-09-16 PROCEDURE — 99213 OFFICE O/P EST LOW 20 MIN: CPT | Performed by: NURSE PRACTITIONER

## 2021-09-16 RX ORDER — PREDNISONE 20 MG/1
20 TABLET ORAL 2 TIMES DAILY
Qty: 10 TABLET | Refills: 0 | Status: SHIPPED | OUTPATIENT
Start: 2021-09-16 | End: 2021-09-21

## 2021-09-16 ASSESSMENT — PAIN DESCRIPTION - ORIENTATION: ORIENTATION: LEFT

## 2021-09-16 ASSESSMENT — PAIN SCALES - GENERAL: PAINLEVEL_OUTOF10: 4

## 2021-09-16 ASSESSMENT — PAIN DESCRIPTION - LOCATION: LOCATION: FOOT

## 2021-09-16 NOTE — LETTER
[facility]  [address] [place] [address]  Phone: [phone]               September 16, 2021    Patient: Osvaldo Reynoso   YOB: 1975   Date of Visit: 9/16/2021       To Whom It May Concern:    Zeyad Spain was seen and treated in our emergency department on 9/16/2021. He may return to work on 9/20/2021.       Sincerely,       MARK Nance CNP         Signature:____Electronically signed by MARK Nance CNP on 9/16/2021 at 12:09 PM  ______________________________

## 2021-09-16 NOTE — ED PROVIDER NOTES
[facility]gent Care Encounter       CHIEF COMPLAINT       Chief Complaint   Patient presents with    Foot Pain       Nurses Notes reviewed and I agree except as noted in the HPI. HISTORY OF PRESENT ILLNESS   Hazel Zhang is a 40 y.o. male who presents to the urgent care center complaining of pain to the left heel that seems to be progressively getting worse. Patient states that he does work and stands on his feet all day. He rates his pain 4 on a 10 scale. Patient states that it does get better with rest.  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 Patient denies any paresthesias to the foot but does complain heel pain at the back of the heel with flexion and extension of the foot. No  was used. Foot Pain  This is a new problem. The problem occurs constantly. The problem has not changed since onset. Nothing aggravates the symptoms. Nothing relieves the symptoms. He has tried rest for the symptoms. The treatment provided mild relief. REVIEW OF SYSTEMS     Review of Systems    PAST MEDICAL HISTORY         Diagnosis Date    Blind     left eye    Essential hypertension 6/21/2017    Gastroesophageal reflux disease without esophagitis 1/29/2018    Gastroesophageal reflux disease without esophagitis 1/29/2018    Hypertension     10/7/2015    Tobacco use disorder 1/23/2018       SURGICALHISTORY     Patient  has a past surgical history that includes eye surgery.     CURRENT MEDICATIONS       Current Discharge Medication List      CONTINUE these medications which have NOT CHANGED    Details   amLODIPine (NORVASC) 10 MG tablet Take 1 tablet by mouth daily  Qty: 90 tablet, Refills: 3    Associated Diagnoses: Essential hypertension      hydroCHLOROthiazide (HYDRODIURIL) 12.5 MG tablet Take 1 tablet by mouth daily  Qty: 90 tablet, Refills: 3    Associated Diagnoses: Essential hypertension      omeprazole (PRILOSEC) 10 MG delayed release capsule Take 10 mg by mouth daily      acetaminophen (TYLENOL) 500 MG tablet Take 1,000 mg by mouth every 6 hours as needed for Pain      Elastic Bandages & Supports (JOBST KNEE HIGH COMPRESSION SM) MISC 1 each by Does not apply route daily as needed (edema)  Qty: 1 each, Refills: 0      Multiple Vitamin (MULTI VITAMIN DAILY PO) Take by mouth      aspirin (ASPIRIN CHILDRENS) 81 MG chewable tablet Take 1 tablet by mouth daily  Qty: 30 tablet, Refills: 3             ALLERGIES     Patient is has No Known Allergies. Patients   Immunization History   Administered Date(s) Administered    Influenza Virus Vaccine 10/08/2015    Pneumococcal Polysaccharide ([id number]) 10/08/2015       FAMILY HISTORY     Patient's family history includes Heart Disease in his father; High Blood Pressure in his father and mother. SOCIAL HISTORY     Patient  reports that he has been smoking cigarettes. He has a 11.00 pack-year smoking history. He has never used smokeless tobacco. He reports current alcohol use of about 24.0 standard drinks of alcohol per week. He reports that he does not use drugs. PHYSICAL EXAM     ED TRIAGE VITALS  BP: 135/81, Temp: 97.1 °F (36.2 °C), Pulse: 83, Resp: 16, SpO2: 98 %,Estimated body mass index is 26.58 kg/m² as calculated from the following:    Height as of 3/9/21: 5' 9\" (1.753 m). Weight as of 3/9/21: 180 lb (81.6 kg). ,No LMP for male patient. Physical Exam  Vitals and nursing note reviewed. Constitutional:       General: He is not in acute distress. Appearance: Normal appearance. He is well-developed. He is not ill-appearing, toxic-appearing or diaphoretic. HENT:      Head: Normocephalic and atraumatic. Right Ear: External ear normal.      Left Ear: External ear normal.      Nose: Nose normal.   Cardiovascular:      Rate and Rhythm: Normal rate.    Pulmonary:      Effort: Pulmonary effort is normal. Musculoskeletal:         General: Tenderness present. No swelling. Cervical back: Normal range of motion. Left foot: Normal range of motion. Swelling and tenderness present. Legs:       Comments: Left foot-heel  he has a small bump noted to the posterior calcaneus. Patient had a negative Hitesh Nola' sign patient did not have any pain to the Achilles tendon to the lower leg. Patient does have point tenderness to the Achilles. Patient does have flexion extension of the foot   Skin:     General: Skin is warm and dry. Capillary Refill: Capillary refill takes less than 2 seconds. Neurological:      Mental Status: He is alert and oriented to person, place, and time. Sensory: No sensory deficit. Psychiatric:         Mood and Affect: Mood normal.         Behavior: Behavior normal. Behavior is cooperative. DIAGNOSTIC RESULTS     Labs:No results found for this visit on 09/16/21. IMAGING:    No orders to display         EKG:      URGENT CARE COURSE:     Vitals:    09/16/21 1145   BP: 135/81   Pulse: 83   Resp: 16   Temp: 97.1 °F (36.2 °C)   TempSrc: Infrared   SpO2: 98%       Medications - No data to display         PROCEDURES:  None    FINAL IMPRESSION      1. Calcaneal bursitis (heel), left          DISPOSITION/ PLAN     Compression with Wrap to the left ankle  Ice, elevation 15-20 minutes 3 times a day for the first 24-48 hours followed with heat 15-20 minutes 3 times a day thereafter. Activity as tolerated.     Take medication as directed  Return for worsening symptoms, otherwise if symptoms persist follow up orthopedics in 1 week      PATIENT REFERRED TO:  [address] / [place] [id number]      DISCHARGE MEDICATIONS:  Current Discharge Medication List          Current Discharge Medication List      STOP taking these medications       azelastine (ASTELIN) 0.1 % nasal spray Comments:   Reason for Stopping:         tolnaftate (TINACTIN) 1 % powder Comments:   Reason for Stopping:               Current Discharge Medication List          MARK Vo CNP    (Please note that portions of this note were completed with a voice recognition program. Efforts were made to edit the dictations but occasionally words are mis-transcribed.)           MARK Vo CNP  09/16/21 [id number]

## 2021-09-16 NOTE — ED TRIAGE NOTES
Pain and swelling has been getting worse on left heel, worse after being on feet all days, better with rest

## 2021-11-06 DIAGNOSIS — I10 ESSENTIAL HYPERTENSION: ICD-10-CM

## 2021-11-08 ENCOUNTER — HOSPITAL ENCOUNTER (EMERGENCY)
Age: 46
Discharge: HOME | End: 2021-11-08
Payer: MEDICAID

## 2021-11-08 VITALS
DIASTOLIC BLOOD PRESSURE: 78 MMHG | HEART RATE: 80 BPM | WEIGHT: 180 LBS | RESPIRATION RATE: 16 BRPM | OXYGEN SATURATION: 97 % | HEIGHT: 69 IN | TEMPERATURE: 97.7 F | SYSTOLIC BLOOD PRESSURE: 135 MMHG | BODY MASS INDEX: 26.66 KG/M2

## 2021-11-08 DIAGNOSIS — J06.9 VIRAL URI WITH COUGH: Primary | ICD-10-CM

## 2021-11-08 PROCEDURE — 99213 OFFICE O/P EST LOW 20 MIN: CPT

## 2021-11-08 PROCEDURE — 99213 OFFICE O/P EST LOW 20 MIN: CPT | Performed by: NURSE PRACTITIONER

## 2021-11-08 RX ORDER — FLUTICASONE PROPIONATE 50 MCG
1 SPRAY, SUSPENSION (ML) NASAL DAILY
Qty: 16 G | Refills: 0 | Status: SHIPPED | OUTPATIENT
Start: 2021-11-08

## 2021-11-08 RX ORDER — AMLODIPINE BESYLATE 10 MG/1
TABLET ORAL
Qty: 90 TABLET | Refills: 3 | OUTPATIENT
Start: 2021-11-08

## 2021-11-08 RX ORDER — GUAIFENESIN 600 MG/1
600 TABLET, EXTENDED RELEASE ORAL 2 TIMES DAILY
Qty: 30 TABLET | Refills: 0 | Status: SHIPPED | OUTPATIENT
Start: 2021-11-08 | End: 2021-11-23

## 2021-11-08 RX ORDER — BENZONATATE 200 MG/1
200 CAPSULE ORAL 3 TIMES DAILY PRN
Qty: 30 CAPSULE | Refills: 0 | Status: SHIPPED | OUTPATIENT
Start: 2021-11-08 | End: 2021-11-15

## 2021-11-08 RX ORDER — CETIRIZINE HYDROCHLORIDE 10 MG/1
10 TABLET ORAL DAILY
Qty: 30 TABLET | Refills: 0 | Status: SHIPPED | OUTPATIENT
Start: 2021-11-08 | End: 2021-12-08

## 2021-11-08 ASSESSMENT — PAIN DESCRIPTION - LOCATION: LOCATION: EAR

## 2021-11-08 ASSESSMENT — ENCOUNTER SYMPTOMS
CHEST TIGHTNESS: 0
SHORTNESS OF BREATH: 0
COUGH: 1
RHINORRHEA: 1
NAUSEA: 0
VOMITING: 0
SORE THROAT: 0
DIARRHEA: 0

## 2021-11-08 ASSESSMENT — PAIN DESCRIPTION - PAIN TYPE: TYPE: ACUTE PAIN

## 2021-11-08 ASSESSMENT — PAIN DESCRIPTION - DESCRIPTORS: DESCRIPTORS: ACHING

## 2021-11-08 ASSESSMENT — PAIN DESCRIPTION - ORIENTATION: ORIENTATION: LEFT

## 2021-11-08 NOTE — ED PROVIDER NOTES
[facility]  Urgent Care Encounter       CHIEF COMPLAINT       Chief Complaint   Patient presents with    Otalgia    Nasal Congestion       Nurses Notes reviewed and I agree except as noted in the HPI. HISTORY OF PRESENT ILLNESS   Gricelda Arias is a 40 y.o. male who presents to the [facility] urgent care for evaluation of ear pain and nasal congestion. He reports symptoms started 3 days ago. He denies known exposure to someone ill. He does report nasal congestion, rhinorrhea, postnasal drainage, and cough. He denies sore throat. He denies fever. The history is provided by the patient. No  was used. REVIEW OF SYSTEMS     Review of Systems   Constitutional: Negative for activity change, appetite change, chills, fatigue and fever. HENT: Positive for congestion, ear pain, postnasal drip and rhinorrhea. Negative for ear discharge and sore throat. Respiratory: Positive for cough. Negative for chest tightness and shortness of breath. Cardiovascular: Negative for chest pain. Gastrointestinal: Negative for diarrhea, nausea and vomiting. Genitourinary: Negative for dysuria. Skin: Negative for rash. Allergic/Immunologic: Negative for environmental allergies and food allergies. Neurological: Negative for dizziness and headaches. PAST MEDICAL HISTORY         Diagnosis Date    Blind     left eye    Essential hypertension 6/21/2017    Gastroesophageal reflux disease without esophagitis 1/29/2018    Gastroesophageal reflux disease without esophagitis 1/29/2018    Hypertension     10/7/2015    Tobacco use disorder 1/23/2018       SURGICALHISTORY     Patient  has a past surgical history that includes eye surgery.     CURRENT MEDICATIONS       Discharge Medication List as of 11/8/2021  9:23 AM      CONTINUE these medications which have NOT CHANGED    Details   amLODIPine (NORVASC) 10 MG tablet Take 1 tablet by mouth daily, Disp-90 external ear normal. A middle ear effusion is present. Nose: Nose normal. No congestion or rhinorrhea. Mouth/Throat:      Mouth: Mucous membranes are moist.      Pharynx: Oropharynx is clear. No oropharyngeal exudate or posterior oropharyngeal erythema. Cardiovascular:      Rate and Rhythm: Normal rate. Pulses: Normal pulses. Pulmonary:      Effort: Pulmonary effort is normal. No respiratory distress. Breath sounds: No stridor. No wheezing or rhonchi. Abdominal:      General: Abdomen is flat. Bowel sounds are normal.      Palpations: Abdomen is soft. Musculoskeletal:         General: No swelling or tenderness. Normal range of motion. Cervical back: Normal range of motion. Neurological:      General: No focal deficit present. Mental Status: He is alert and oriented to person, place, and time. Psychiatric:         Mood and Affect: Mood normal.         Behavior: Behavior normal.         DIAGNOSTIC RESULTS     Labs:No results found for this visit on 11/08/21. IMAGING:    No orders to display         EKG: None      URGENT CARE COURSE:     Vitals:    11/08/21 0910   BP: 135/78   Pulse: 80   Resp: 16   Temp: 97.7 °F (36.5 °C)   TempSrc: Temporal   SpO2: 97%   Weight: 180 lb (81.6 kg)   Height: 5' 9\" (1.753 m)       Medications - No data to display         PROCEDURES:  None    FINAL IMPRESSION      1. Viral URI with cough          DISPOSITION/ PLAN     Patient seen and evaluated for the above symptoms. History and assessment consistent with likely viral URI with cough. Patient is prescribed Mucinex, Flonase, Zyrtec, and Tessalon. He is instructed use over-the-counter Tylenol or Motrin for pain or fever. He is instructed to follow-up with his PCP in 3 to 5 days with new or worsening symptoms. Patient is agreeable with the above plan and denies questions or concerns at this time.       PATIENT REFERRED TO:  Sheri Hewitt, APRN - CNP  [id number] / [place] [facility] [place] [address]      DISCHARGE MEDICATIONS:  Discharge Medication List as of 11/8/2021  9:23 AM      START taking these medications    Details   guaiFENesin (MUCINEX) 600 MG extended release tablet Take 1 tablet by mouth 2 times daily for 15 days, Disp-30 tablet, R-0Normal      fluticasone (FLONASE) 50 MCG/ACT nasal spray 1 spray by Each Nostril route daily, Disp-16 g, R-0Normal      cetirizine (ZYRTEC) 10 MG tablet Take 1 tablet by mouth daily, Disp-30 tablet, R-0Normal      benzonatate (TESSALON) 200 MG capsule Take 1 capsule by mouth 3 times daily as needed for Cough, Disp-30 capsule, R-0Normal             Discharge Medication List as of 11/8/2021  9:23 AM          Discharge Medication List as of 11/8/2021  9:23 AM          MARK Wall CNP    (Please note that portions of this note were completed with a voice recognition program. Efforts were made to edit the dictations but occasionally words are mis-transcribed.)           MARK Wall CNP  11/08/21 [id number]

## 2021-11-08 NOTE — Clinical Note
Sergo Salazar was seen and treated in our emergency department on 11/8/2021. He may return to work on 11/11/2021. May be off work one to two days if needed     If you have any questions or concerns, please don't hesitate to call.       Hayder Fajardo, APRN - CNP

## 2021-11-08 NOTE — TELEPHONE ENCOUNTER
Recent Visits  No visits were found meeting these conditions. Showing recent visits within past 540 days with a meds authorizing provider and meeting all other requirements  Future Appointments  No visits were found meeting these conditions. Showing future appointments within next 150 days with a meds authorizing provider and meeting all other requirements    No future appointments.

## 2021-11-09 ENCOUNTER — OFFICE VISIT (OUTPATIENT)
Dept: FAMILY MEDICINE CLINIC | Age: 46
End: 2021-11-09
Payer: MEDICAID

## 2021-11-09 VITALS
SYSTOLIC BLOOD PRESSURE: 130 MMHG | RESPIRATION RATE: 16 BRPM | HEART RATE: 87 BPM | OXYGEN SATURATION: 98 % | BODY MASS INDEX: 26.72 KG/M2 | HEIGHT: 69 IN | DIASTOLIC BLOOD PRESSURE: 82 MMHG | WEIGHT: 180.38 LBS | TEMPERATURE: 98.1 F

## 2021-11-09 DIAGNOSIS — I10 ESSENTIAL HYPERTENSION: Primary | ICD-10-CM

## 2021-11-09 DIAGNOSIS — J01.90 ACUTE RHINOSINUSITIS: ICD-10-CM

## 2021-11-09 DIAGNOSIS — H66.92 ACUTE LEFT OTITIS MEDIA: ICD-10-CM

## 2021-11-09 DIAGNOSIS — R73.01 IMPAIRED FASTING GLUCOSE: ICD-10-CM

## 2021-11-09 DIAGNOSIS — Z12.11 SCREENING FOR COLON CANCER: ICD-10-CM

## 2021-11-09 PROCEDURE — G8419 CALC BMI OUT NRM PARAM NOF/U: HCPCS | Performed by: NURSE PRACTITIONER

## 2021-11-09 PROCEDURE — 99214 OFFICE O/P EST MOD 30 MIN: CPT | Performed by: NURSE PRACTITIONER

## 2021-11-09 PROCEDURE — 4004F PT TOBACCO SCREEN RCVD TLK: CPT | Performed by: NURSE PRACTITIONER

## 2021-11-09 PROCEDURE — G8484 FLU IMMUNIZE NO ADMIN: HCPCS | Performed by: NURSE PRACTITIONER

## 2021-11-09 PROCEDURE — G8427 DOCREV CUR MEDS BY ELIG CLIN: HCPCS | Performed by: NURSE PRACTITIONER

## 2021-11-09 RX ORDER — AMLODIPINE BESYLATE 10 MG/1
10 TABLET ORAL DAILY
Qty: 90 TABLET | Refills: 3 | Status: SHIPPED | OUTPATIENT
Start: 2021-11-09

## 2021-11-09 RX ORDER — HYDROCHLOROTHIAZIDE 12.5 MG/1
12.5 TABLET ORAL DAILY
Qty: 90 TABLET | Refills: 3 | Status: SHIPPED | OUTPATIENT
Start: 2021-11-09

## 2021-11-09 RX ORDER — AMOXICILLIN AND CLAVULANATE POTASSIUM 875; 125 MG/1; MG/1
1 TABLET, FILM COATED ORAL 2 TIMES DAILY
Qty: 20 TABLET | Refills: 0 | Status: SHIPPED | OUTPATIENT
Start: 2021-11-09 | End: 2021-11-19

## 2021-11-09 ASSESSMENT — PATIENT HEALTH QUESTIONNAIRE - PHQ9
SUM OF ALL RESPONSES TO PHQ QUESTIONS 1-9: 0
1. LITTLE INTEREST OR PLEASURE IN DOING THINGS: 0
2. FEELING DOWN, DEPRESSED OR HOPELESS: 0
SUM OF ALL RESPONSES TO PHQ QUESTIONS 1-9: 0
SUM OF ALL RESPONSES TO PHQ QUESTIONS 1-9: 0
SUM OF ALL RESPONSES TO PHQ9 QUESTIONS 1 & 2: 0

## 2021-11-09 NOTE — PROGRESS NOTES
Chief Complaint   Patient presents with   [employer] ED Follow-up     urgent care for sinus infection and ear pain     Medication Refill     no bp meds taken in x3 days       History obtained from chart review and the patient. SUBJECTIVE:  Durward Leventhal is a 40 y.o. male that presents today for follow up HTN and recent visit to the     URI Symptoms    HPI:      Symptoms have been present for 3 day(s). Symptoms are unchanged since they initially started. Fever? No  Runny nose or congestion? Yes   Cough? Yes - tickle in the throat  Sore throat? No  Headache, fatigue, joint pains, muscle aches? Yes - some sinus pressure  Shortness of breath/Wheezing? No  Nausea/Vomiting/Diarrhea? No  Double Sickening? No  Sick contacts? No    Patient has tried mucinex, flonase, Zyrtec and Tessalon perles without improvement. C/o left ear pain for several days. HTN    Does patient check BP regularly at home? - Yes - 130/80  Current Medication regimen - HCTZ, norvasc  Tolerating medications well? - yes    Shortness of breath or chest pain? No  Headache or visual complaints?no  Neurologic changes like confusion? No  Extremity edema?  No    BP Readings from Last 3 Encounters:   11/09/21 130/82   11/08/21 135/78   09/16/21 135/81       Age/Gender Health Maintenance    Lipid -   Lab Results   Component Value Date    CHOL 145 10/08/2015     Lab Results   Component Value Date    TRIG 94 10/08/2015     Lab Results   Component Value Date    HDL 41 10/08/2015     Lab Results   Component Value Date    LDLCALC 85 10/08/2015     DM Screen - No results found for: LABA1C  No results found for: EAG  Colon Cancer Screening - 40  Lung Cancer Screening (Age 54 to [de-identified] with 30 pack year hx, current smoker or quit within past 15 years) -49    Tetanus - needs  Influenza Vaccine - yearly  Pneumonia Vaccine - 65  Zostavax - 51  HPV Vaccine - n/a    PSA testing discussion - 55  AAA Screening -66    Falls screening - n/a    Current Outpatient Medications   Medication Sig Dispense Refill    amLODIPine (NORVASC) 10 MG tablet Take 1 tablet by mouth daily 90 tablet 3    hydroCHLOROthiazide (HYDRODIURIL) 12.5 MG tablet Take 1 tablet by mouth daily 90 tablet 3    amoxicillin-clavulanate (AUGMENTIN) 875-125 MG per tablet Take 1 tablet by mouth 2 times daily for 10 days 20 tablet 0    guaiFENesin (MUCINEX) 600 MG extended release tablet Take 1 tablet by mouth 2 times daily for 15 days 30 tablet 0    fluticasone (FLONASE) 50 MCG/ACT nasal spray 1 spray by Each Nostril route daily 16 g 0    cetirizine (ZYRTEC) 10 MG tablet Take 1 tablet by mouth daily 30 tablet 0    benzonatate (TESSALON) 200 MG capsule Take 1 capsule by mouth 3 times daily as needed for Cough 30 capsule 0    omeprazole (PRILOSEC) 10 MG delayed release capsule Take 10 mg by mouth daily      acetaminophen (TYLENOL) 500 MG tablet Take 1,000 mg by mouth every 6 hours as needed for Pain      Elastic Bandages & Supports (JOBST KNEE HIGH COMPRESSION SM) MISC 1 each by Does not apply route daily as needed (edema) 1 each 0    Multiple Vitamin (MULTI VITAMIN DAILY PO) Take by mouth      aspirin (ASPIRIN CHILDRENS) 81 MG chewable tablet Take 1 tablet by mouth daily 30 tablet 3     No current facility-administered medications for this visit. Orders Placed This Encounter   Medications    amLODIPine (NORVASC) 10 MG tablet     Sig: Take 1 tablet by mouth daily     Dispense:  90 tablet     Refill:  3    hydroCHLOROthiazide (HYDRODIURIL) 12.5 MG tablet     Sig: Take 1 tablet by mouth daily     Dispense:  90 tablet     Refill:  3    amoxicillin-clavulanate (AUGMENTIN) 875-125 MG per tablet     Sig: Take 1 tablet by mouth 2 times daily for 10 days     Dispense:  20 tablet     Refill:  0         All medications reviewed and reconciled, including OTC and herbal medications. Updated list given to patient.        Patient Active Problem List   Diagnosis    Chest pain    Essential hypertension    Tobacco use disorder    Gastroesophageal reflux disease without esophagitis       Past Medical History:   Diagnosis Date    Blind     left eye    Essential hypertension 6/21/2017    Gastroesophageal reflux disease without esophagitis 1/29/2018    Gastroesophageal reflux disease without esophagitis 1/29/2018    Hypertension     10/7/2015    Tobacco use disorder 1/23/2018       Past Surgical History:   Procedure Laterality Date    EYE SURGERY      Glass eye placement, left eye. No Known Allergies    Social History     Socioeconomic History    Marital status: Single     Spouse name: Not on file    Number of children: Not on file    Years of education: Not on file    Highest education level: Not on file   Occupational History    Not on file   Tobacco Use    Smoking status: Current Every Day Smoker     Packs/day: 0.50     Years: 22.00     Pack years: 11.00     Types: Cigarettes    Smokeless tobacco: Never Used   Vaping Use    Vaping Use: Never used   Substance and Sexual Activity    Alcohol use: Yes     Alcohol/week: 24.0 standard drinks     Types: 24 Cans of beer per week     Comment: socially    Drug use: No    Sexual activity: Yes     Partners: Female   Other Topics Concern    Not on file   Social History Narrative    Not on file     Social Determinants of Health     Financial Resource Strain:     Difficulty of Paying Living Expenses: Not on file   Food Insecurity:     Worried About Running Out of Food in the Last Year: Not on file    Gabriela of Food in the Last Year: Not on file   Transportation Needs:     Lack of Transportation (Medical): Not on file    Lack of Transportation (Non-Medical):  Not on file   Physical Activity:     Days of Exercise per Week: Not on file    Minutes of Exercise per Session: Not on file   Stress:     Feeling of Stress : Not on file   Social Connections:     Frequency of Communication with Friends and Family: Not on file    Frequency of Social Gatherings with Friends and Family: Not on file    Attends Roman Catholic Services: Not on file    Active Member of Clubs or Organizations: Not on file    Attends Club or Organization Meetings: Not on file    Marital Status: Not on file   Intimate Partner Violence:     Fear of Current or Ex-Partner: Not on file    Emotionally Abused: Not on file    Physically Abused: Not on file    Sexually Abused: Not on file   Housing Stability:     Unable to Pay for Housing in the Last Year: Not on file    Number of [place] in the Last Year: Not on file    Unstable Housing in the Last Year: Not on file       Family History   Problem Relation Age of Onset    Heart Disease Father     High Blood Pressure Father     High Blood Pressure Mother          I have reviewed the patient's past medical history, past surgical history, allergies, medications, social and family history and I have made updates where appropriate.       Review of Systems  Positive responses are highlighted in bold    Constitutional:  Fever, Chills, Night Sweats, Fatigue, Unexpected changes in weight  Eyes:  Eye discharge, Eye pain, Eye redness, Visual disturbances   HENT:  Ear pain, Tinnitus, Nosebleeds, Trouble swallowing, Hearing loss, Sore throat  Cardiovascular:  Chest Pain, Palpitations, Orthopnea, Paroxysmal Nocturnal Dyspnea  Respiratory:  Cough, Wheezing, Shortness of breath, Chest tightness, Apnea  Gastrointestinal:  Nausea, Vomiting, Diarrhea, Constipation, Heartburn, Blood in stool  Genitourinary:  Difficulty or painful urination, Flank pain, Change in frequency, Urgency  Skin:  Color change, Rash, Itching, Wound  Psychiatric:  Hallucinations, Anxiety, Depression, Suicidal ideation  Hematological:  Enlarged glands, Easy bleeding, Easily bruising  Musculoskeletal:  Joint pain, Back pain, Gait problems, Joint swelling, Myalgias  Neurological:  Dizziness, Headaches, Presyncope, Numbness, Seizures, Tremors  Allergy:  Environmental allergies, Food allergies  Endocrine:  Heat Intolerance, Cold Intolerance, Polydipsia, Polyphagia, Polyuria    Lab Results   Component Value Date     03/09/2021    K 4.2 03/09/2021     03/09/2021    CO2 26 03/09/2021    BUN 14 03/09/2021    CREATININE 0.7 03/09/2021    GLUCOSE 103 03/09/2021    CALCIUM 9.6 03/09/2021    PROT 6.9 03/09/2021    LABALBU 4.6 03/09/2021    BILITOT 0.5 03/09/2021    ALKPHOS 54 03/09/2021    AST 15 03/09/2021    ALT 21 03/09/2021    LABGLOM >90 03/09/2021     Lab Results   Component Value Date    WBC 7.6 03/09/2021    HGB 15.4 03/09/2021    HCT 45.9 03/09/2021    MCV 95.4 (H) 03/09/2021     03/09/2021     PHYSICAL EXAM:  Vitals:    11/09/21 0916   BP: 130/82   Pulse: 87   Resp: 16   Temp: 98.1 °F (36.7 °C)   TempSrc: Oral   SpO2: 98%   Weight: 180 lb 6 oz (81.8 kg)   Height: 5' 9\" (1.753 m)     Body mass index is 26.64 kg/m². VS Reviewed  General Appearance: A&O x 3, No acute distress,well developed and well- nourished  Head: normocephalic and atraumatic  Eyes: pupils equal, round, and reactive to light, extraocular eye movements intact, conjunctivae and eye lids without erythema  ENT: external ear and ear canal clear bilaterally, left TM erythematous, right TM intact and regular, nose without deformity, nasal mucosa and turbinates normal without polyps, oropharynx normal, dentition is normal for age  Neck: supple and non-tender without mass, no thyromegaly or thyroid nodules, no cervical lymphadenopathy  Pulmonary/Chest: clear to auscultation bilaterally- no wheezes, rales or rhonchi, normal air movement, no respiratory distress or retractions  Cardiovascular: S1 and S2 auscultated w/ RRR. No murmurs, rubs, clicks, or gallops, distal pulses intact. Abdomen: soft, non-tender, non-distended, bowl sounds physiologic,  no rebound or guarding, no masses or hernias noted. Liver and spleen without enlargement.    Extremities: no cyanosis, clubbing or edema of the lower extremities  Musculoskeletal: No joint swelling or gross deformity   Neuro:  Alert, 5/5 strength globally and symmetrically  Psych: Affect appropriate. Mood normal. Thought process is normal without evidence of depression or psychosis. Good insight and appropriate interaction. Cognition and memory appear to be intact. Skin: warm and dry, no rash or erythema  Lymph:  No cervical, auricular or supraclavicular lymph nodes palpated    ASSESSMENT & PLAN  Tai Gupta was seen today for ed follow-up and medication refill. Diagnoses and all orders for this visit:    Essential hypertension  -     amLODIPine (NORVASC) 10 MG tablet; Take 1 tablet by mouth daily  -     hydroCHLOROthiazide (HYDRODIURIL) 12.5 MG tablet; Take 1 tablet by mouth daily  -     Lipid, Fasting; Future    Impaired fasting glucose  -     Hemoglobin A1C; Future    Acute left otitis media  -     amoxicillin-clavulanate (AUGMENTIN) 875-125 MG per tablet; Take 1 tablet by mouth 2 times daily for 10 days    Acute rhinosinusitis  -     amoxicillin-clavulanate (AUGMENTIN) 875-125 MG per tablet; Take 1 tablet by mouth 2 times daily for 10 days    Screening for colon cancer  -     Cologuard; Future      - con't Coricidin HBP  - add Augmentin  - BP stable and reasonable control, con't HCTZ and Norvasc  - check FLP and A1C  - patient agreeable to doing Cologuard    DISPOSITION    Return in about 1 year (around 11/9/2022) for chronic conditions. Tai Gupta released without restrictions. PATIENT COUNSELING    Counseling was provided today regarding the following topics: Healthy eating habits, Regular exercise, substance abuse and healthy sleep habits. Tai Gupta received counseling on the following healthy behaviors: exercise and medication adherence    Patient given educational materials on: See Attached    I have instructed Tai Gupta to complete a self tracking handout on none and instructed them to bring it with them to his next appointment.      Barriers to learning and self management: none    Discussed use, benefit, and side effects of prescribed medications. Barriers to medication compliance addressed. All patient questions answered. Pt voiced understanding.        Electronically signed by MARK Montiel CNP on 11/9/2021 at 9:31 AM

## 2022-01-12 ENCOUNTER — TELEPHONE (OUTPATIENT)
Dept: FAMILY MEDICINE CLINIC | Age: 47
End: 2022-01-12

## 2022-02-23 ENCOUNTER — HOSPITAL ENCOUNTER (OUTPATIENT)
Dept: PHYSICAL THERAPY | Age: 47
Setting detail: THERAPIES SERIES
Discharge: HOME | End: 2022-02-23
Payer: MEDICAID

## 2022-02-23 PROCEDURE — 97161 PT EVAL LOW COMPLEX 20 MIN: CPT

## 2022-02-23 NOTE — PROGRESS NOTES
** PLEASE SIGN, DATE AND TIME CERTIFICATION BELOW AND RETURN TO [facility] OUTPATIENT REHABILITATION (FAX #: [phone]). ATTEST/CO-SIGN IF ACCESSING VIA [employer]. THANK YOU.**    I certify that I have examined the patient below and determined that Physical Medicine and Rehabilitation service is necessary and that I approve the established plan of care for up to 90 days or as specifically noted. Attestation, signature or co-signature of physician indicates approval of certification requirements.    ________________________ ____________ __________  Physician Signature   Date   Time  [address] [facility]  PHYSICAL THERAPY  [x] EVALUATION  [] DAILY NOTE (LAND) [] DAILY NOTE (AQUATIC ) [] PROGRESS NOTE [] DISCHARGE NOTE    [x] [address] [facility]   [] [place] [address]    [] [address] [facility]   [] [facility]    Date: 2022  Patient Name:  Lauren Rodriguez  : 1975  MRN: [id number]  CSN: [id number]    Referring Practitioner Radha Robledo MD   Diagnosis Encounter for other orthopedic aftercare [Z47.89]  Achilles tendinitis, left leg [M76.62]    Treatment Diagnosis Difficulty with ambulation   Date of Evaluation 22    Additional Pertinent History Full WB in shoe, high BP      Functional Outcome Measure Used LEFS   Functional Outcome Score 43 (22)       Insurance: Primary: Payor: Maryana Garcia /  /  / ,   Secondary:    Authorization Information: INSURANCE THERAPY BENEFIT: No precert until after 39IM visit. Visit Limit Based on Precert  AQUATIC THERAPY COVERED:   Yes  MODALITIES COVERED:  Yes except for Iontophoresis and Hot/Cold Packs. TELEHEALTH COVERED: Yes   Visit # 1, 1/10 for progress note   Visits Allowed:    Recertification Date:    Physician Follow-Up: 2022   Physician Orders: Stretching, theraband, modalities   History of Present Illness:   On  patient had surgery for left torn achilles tendon. Things are going well since surgery. Little pain. Does have a limp with walking. Has been working on ROM at home. Has been able to wear a shoe for the past 2 days. SUBJECTIVE: On December 2nd patient had surgery for left torn achilles tendon. Things are going well since surgery. Little pain. Does have a limp with walking. Has been working on ROM at home. Has been able to wear a shoe for the past 2 days. Social/Functional History and Current Status:  Medications and Allergies have been reviewed and are listed on Medical History Questionnaire. Matt Beach lives with significant other in a multiple floor home with stairs and no handrail to enter. Task Previous Current   ADLs  Independent Independent   IADL's Independent Independent   Ambulation Independent Independent   Transfers Independent Independent   Recreation Independent Independent   Community Integration Independent Independent   Driving Active  Active    Work StrikeAd. Occupation: [employer]-. Walks ~22 miles per night. Off Work Due to Injury. OBJECTIVE:    Pain: 1/10 left achilles   Palpation No pain with pressure   Observation Ankle figure 8: Right=53.1cm, Left=53.5cm           Range of Motion Ankle: Right: DF=8, PF=45, Inv=22, Ev=18 degrees. Left: DF=3, PF=32, Inv=18, Ev=11 degrees. Strength Ankle: Right=5/5, Left=4/5   Coordination Decreased left LE   Sensation No numbness or tingling   Bed Mobility Independent   Transfers Independent   Ambulation Ambulates with no AD at a decreased pace, limp, decreased heel strike on left   Stairs Ascends/descends 4 steps. Reciprocal ascending and non-reciprocal descending steps   Balance Right SLS=30 sec, Left SLS=7 sec.    Special Tests See LEFS         TREATMENT   Precautions: Full WB in shoe, high BP   Pain: 1/10 left achilles    X in shaded column indicates activity completed today   Modalities Parameters/  Location  Notes Manual Therapy Time/Technique  Notes                     Exercise/Intervention   Notes                                                                                  Specific Interventions Next Treatment: Left ankle stretches and strengthening, ambulation, balance, modalities as needed. Activity/Treatment Tolerance:  [x]  Patient tolerated treatment well  []  Patient limited by fatigue  []  Patient limited by pain   []  Patient limited by medical complications  []  Other:     Assessment: Evie Noonan presents to therapy following surgery for reattachment for left achilles. He will benefit from therapy to assist with increasing ROM, strength, improving ambulation. Body Structures/Functions/Activity Limitations: impaired activity tolerance, impaired balance, impaired endurance, impaired ROM, impaired sensation, impaired strength, pain and abnormal gait  Prognosis: good    GOALS:  Patient Goal: \"Try to get all my strength back. \"    Short Term Goals:  Time Frame: 4 weeks  1. Improve LEFS to 46 or greater to assist with return to work. 2.  Patient able to complete all exercises in therapy with no onset of pain to assist with walking into house. 3.  Improve balance to allow Evie Noonan to left SLS 15 seconds to assist with getting dressed. 4.  Increase left ankle ROM to 6 degrees dorsiflexion to assist with ambulation. 5.  Increase left ankle strength to 5/5 to assist with stability walking on unlevel surfaces. Long Term Goals:  Time Frame: 12 weeks  1. Independent with HEP and with progression to assist with decreasing pain and increasing ROM. Patient Education:    [x]  HEP/Education Completed: Plan of Care, Goals   [address] Access Code:  []  No new Education completed  []  Reviewed Prior HEP      []  Patient verbalized and/or demonstrated understanding of education provided. []  Patient unable to verbalize and/or demonstrate understanding of education provided. Will continue education.   [x] Barriers to learning: none per patient    PLAN:  Treatment Recommendations: Strengthening, Range of Motion, Balance Training, Functional Mobility Training, Transfer Training, Endurance Training, Gait Training, Stair Training, Pain Management, Home Exercise Program, Patient Education, Safety Education and Training and Modalities    [x]  Plan of care initiated. Plan to see patient 2 times per week for 12 weeks to address the treatment planned outlined above.   []  Continue with current plan of care  []  Modify plan of care as follows:    []  Hold pending physician visit  []  Discharge    Time In 1200   Time Out 1240   Timed Code Minutes: 0   Total Treatment Time: 40       Electronically Signed by: Hudson Huber PT

## 2022-02-25 ENCOUNTER — HOSPITAL ENCOUNTER (OUTPATIENT)
Dept: PHYSICAL THERAPY | Age: 47
Setting detail: THERAPIES SERIES
Discharge: HOME | End: 2022-02-25
Payer: MEDICAID

## 2022-02-25 PROCEDURE — 97110 THERAPEUTIC EXERCISES: CPT

## 2022-02-25 NOTE — PROGRESS NOTES
x  x    Ankle circles 15 x  x    Ankle ABCs 1 x  x    Ankle 4 way theraband 15 x  Orange  x    Gastroc stretch with towel 3 x 30 sec  x    Seated HR/TR 15 x  x    Seated BAPS board L2, fwd/bwd, lateral, CW, CCW 15 x  x    Standing gastroc and soleus stretch with L foot back 3 x 30 sec  x    Standing HR/TR 10 x  x                    Specific Interventions Next Treatment: Left ankle stretches and strengthening, ambulation, balance, modalities as needed. Activity/Treatment Tolerance:  [x]  Patient tolerated treatment well  []  Patient limited by fatigue  []  Patient limited by pain   []  Patient limited by medical complications  []  Other:     Assessment: Patient was initiated on ankle ROM and strengthening exercises today. Patient does report some soreness after the session. Patient does require cues for exercise technique. Patient provided updated HEP handout and theraband. Patient will be progressed as able to improve functional mobility. Body Structures/Functions/Activity Limitations: impaired activity tolerance, impaired balance, impaired endurance, impaired ROM, impaired sensation, impaired strength, pain and abnormal gait  Prognosis: good    GOALS:  Patient Goal: \"Try to get all my strength back. \"    Short Term Goals:  Time Frame: 4 weeks  1. Improve LEFS to 46 or greater to assist with return to work. 2.  Patient able to complete all exercises in therapy with no onset of pain to assist with walking into house. 3.  Improve balance to allow Roosvelt Raddle to left SLS 15 seconds to assist with getting dressed. 4.  Increase left ankle ROM to 6 degrees dorsiflexion to assist with ambulation. 5.  Increase left ankle strength to 5/5 to assist with stability walking on unlevel surfaces. Long Term Goals:  Time Frame: 12 weeks  1. Independent with HEP and with progression to assist with decreasing pain and increasing ROM.         Patient Education:    [x]  HEP/Education Completed: correct exercise technique, updated HEP    [facility] Access Code: [email or web address]  []  No new Education completed  []  Reviewed Prior HEP      [x]  Patient verbalized and/or demonstrated understanding of education provided. []  Patient unable to verbalize and/or demonstrate understanding of education provided. Will continue education. [x]  Barriers to learning: none per patient    PLAN:  Treatment Recommendations: Strengthening, Range of Motion, Balance Training, Functional Mobility Training, Transfer Training, Endurance Training, Gait Training, Stair Training, Pain Management, Home Exercise Program, Patient Education, Safety Education and Training and Modalities    []  Plan of care initiated. Plan to see patient 2 times per week for 12 weeks to address the treatment planned outlined above.   [x]  Continue with current plan of care  []  Modify plan of care as follows:    []  Hold pending physician visit  []  Discharge    Time In 1130   Time Out 1155   Timed Code Minutes: 25   Total Treatment Time: 25       Electronically Signed by: Medardo Zendejas, PT

## 2022-03-02 ENCOUNTER — HOSPITAL ENCOUNTER (OUTPATIENT)
Dept: PHYSICAL THERAPY | Age: 47
Setting detail: THERAPIES SERIES
Discharge: HOME | End: 2022-03-02
Payer: MEDICAID

## 2022-03-02 PROCEDURE — 97110 THERAPEUTIC EXERCISES: CPT

## 2022-03-02 NOTE — PROGRESS NOTES
[address] [facility]  PHYSICAL THERAPY  [] EVALUATION  [x] DAILY NOTE (LAND) [] DAILY NOTE (AQUATIC ) [] PROGRESS NOTE [] DISCHARGE NOTE    [x] OUTPATIENT REHABILITATION [facility]   [] [place] [address]    [] [facility]   [] Gianni Burkett     Date: 3/2/2022    Patient Name:  Tori Hendricks  : 1975  MRN: [id number]  CSN: [id number]    Referring Practitioner Soham Alejandro MD   Diagnosis Encounter for other orthopedic aftercare [Z47.89]  Achilles tendinitis, left leg [M76.62]    Treatment Diagnosis Difficulty with ambulation   Date of Evaluation 22    Additional Pertinent History Full WB in shoe, high BP      Functional Outcome Measure Used LEFS   Functional Outcome Score 43 (22)       Insurance: Primary: Payor: Lila Dietz /  /  / ,   Secondary:    Authorization Information: INSURANCE THERAPY BENEFIT: No precert until after 46DH visit. Visit Limit Based on Precert  AQUATIC THERAPY COVERED:   Yes  MODALITIES COVERED:  Yes except for Iontophoresis and Hot/Cold Packs. TELEHEALTH COVERED: Yes   Visit # 3, 310 for progress note   Visits Allowed: 30   Recertification Date:    Physician Follow-Up: 2022   Physician Orders: Stretching, theraband, modalities   History of Present Illness: On  patient had surgery for left torn achilles tendon. Things are going well since surgery. Little pain. Does have a limp with walking. Has been working on ROM at home. Has been able to wear a shoe for the past 2 days. SUBJECTIVE: Patient reports that he was \"a little sore\" after his last session. He rates his current pain a 1/10. He reports compliance with his HEP.       Objective:   TREATMENT   Precautions: Full WB in shoe, high BP   Pain: 1/10 left achilles    X in shaded column indicates activity completed today   Modalities Parameters/  Location  Notes                     Manual Therapy Time/Technique  Notes Exercise/Intervention   Notes   Seated: Ankle AROM DF/PF/inv/evr 15 x  X    Ankle circles: CW/CCW 15 x  X    Ankle ABCs 1 x  X    Ankle 4 way theraband 15 x  Orange  X    Gastroc stretch with towel 3 x 30 sec  X    Seated HR/TR 15 x  X    Seated BAPS board L2, fwd/bwd, lateral, CW, CCW 15 x  X Cues for isolated movement at ankle          Standing:        Standing gastroc and soleus stretch with L foot back 3 x 30 sec  X    Standing HR/TR 2x10  X    Standing 3 way hip kicks, marches, mini squats ** 10x each B  X    Standing rockerboard taps/balance (front/back, lateral)** 10x taps  30 sec bal X BUE support with taps, 0-1 UE support with balance   Tandem stance- B lead ** 2x B  30 sec  X    Feet together EC ** 2x 30 sec  X    Step ups: forward/lateral B lead ** 10x ea B  4 inch  X B UE support    Dynamic gait: tandem walking, marches, side stepping ** 2 laps ea // bars  X Light use of UE support                   Specific Interventions Next Treatment: Left ankle stretches and strengthening, ambulation, balance, modalities as needed. Activity/Treatment Tolerance:  [x]  Patient tolerated treatment well  []  Patient limited by fatigue  []  Patient limited by pain   []  Patient limited by medical complications  []  Other:     Assessment: Patient continued with therapeutic exercises as documented above to increase strength and ROM. Added standing therapeutic exercises as indicated by ** in the exercise chart above. He was provided with demonstration and cues on proper technique with added exercises to ensure maximal muscle activation. Occasional cues also provided for isolated ankle movements while performing seated exercises. He denied any increase in his pain at the conclusion of session.      Body Structures/Functions/Activity Limitations: impaired activity tolerance, impaired balance, impaired endurance, impaired ROM, impaired sensation, impaired strength, pain and abnormal gait  Prognosis: good    GOALS:  Patient Goal: \"Try to get all my strength back. \"    Short Term Goals:  Time Frame: 4 weeks  1. Improve LEFS to 46 or greater to assist with return to work. 2.  Patient able to complete all exercises in therapy with no onset of pain to assist with walking into house. 3.  Improve balance to allow [facility] MARYDO to left SLS 15 seconds to assist with getting dressed. 4.  Increase left ankle ROM to 6 degrees dorsiflexion to assist with ambulation. 5.  Increase left ankle strength to 5/5 to assist with stability walking on unlevel surfaces. Long Term Goals:  Time Frame: 12 weeks  1. Independent with HEP and with progression to assist with decreasing pain and increasing ROM. Patient Education:   [x]  HEP/Education Completed: Correct exercise technique, continue HEP-provided with updated HEP, added standing exercises, continue to ice at home for decreased swelling and soreness from additional exercises.  [employer] Access Code: [email or web address]  []  No new Education completed  [x]  Reviewed Prior HEP      [x]  Patient verbalized and/or demonstrated understanding of education provided. []  Patient unable to verbalize and/or demonstrate understanding of education provided. Will continue education. [x]  Barriers to learning: none per patient    PLAN:  Treatment Recommendations: Strengthening, Range of Motion, Balance Training, Functional Mobility Training, Transfer Training, Endurance Training, Gait Training, Stair Training, Pain Management, Home Exercise Program, Patient Education, Safety Education and Training and Modalities    []  Plan of care initiated. Plan to see patient 2 times per week for 12 weeks to address the treatment planned outlined above.   [x]  Continue with current plan of care  []  Modify plan of care as follows:    []  Hold pending physician visit  []  Discharge    Time In 1031   Time Out 1110   Timed Code Minutes: 39 min   Total Treatment Time: 39 min       Electronically Signed by: Zari Mayes, PTA

## 2022-03-04 ENCOUNTER — HOSPITAL ENCOUNTER (OUTPATIENT)
Dept: PHYSICAL THERAPY | Age: 47
Setting detail: THERAPIES SERIES
Discharge: HOME | End: 2022-03-04
Payer: MEDICAID

## 2022-03-04 PROCEDURE — 97110 THERAPEUTIC EXERCISES: CPT

## 2022-03-04 NOTE — PROGRESS NOTES
[address] [facility]  PHYSICAL THERAPY  [] EVALUATION  [x] DAILY NOTE (LAND) [] DAILY NOTE (AQUATIC ) [] PROGRESS NOTE [] DISCHARGE NOTE    [x] OUT[facility]IENT [facility] [place]   [] [place] [address]    [] [address] [facility]   [] Micheal Rosales     Date: 3/4/2022    Patient Name:  Nadine Hughes  : 1975  MRN: [id number]  CSN: [id number]    Referring Practitioner Barb Stubbs MD   Diagnosis Encounter for other orthopedic aftercare [Z47.89]  Achilles tendinitis, left leg [M76.62]    Treatment Diagnosis Difficulty with ambulation   Date of Evaluation 22    Additional Pertinent History Full WB in shoe, high BP      Functional Outcome Measure Used LEFS   Functional Outcome Score 43 (22)       Insurance: Primary: Payor: Tacho Cuellar /  /  / ,   Secondary:    Authorization Information: INSURANCE THERAPY BENEFIT: No precert until after 55SQ visit. Visit Limit Based on Precert  AQUATIC THERAPY COVERED:   Yes  MODALITIES COVERED:  Yes except for Iontophoresis and Hot/Cold Packs. TELEHEALTH COVERED: Yes   Visit # 4, 10 for progress note   Visits Allowed: 30   Recertification Date: 16   Physician Follow-Up: 2022   Physician Orders: Stretching, theraband, modalities   History of Present Illness: On  patient had surgery for left torn achilles tendon. Things are going well since surgery. Little pain. Does have a limp with walking. Has been working on ROM at home. Has been able to wear a shoe for the past 2 days. SUBJECTIVE: Patient reports that he is feeling pretty good. Some increased soreness when he first gets up.     Objective:   TREATMENT   Precautions: Full WB in shoe, high BP   Pain: 210 left achilles    X in shaded column indicates activity completed today   Modalities Parameters/  Location  Notes                     Manual Therapy Time/Technique  Notes                     Exercise/Intervention   Notes Seated: Ankle AROM DF/PF/inv/evr 15 x      Ankle circles: CW/CCW 15 x      Ankle ABCs 1 x  X    Ankle 4 way theraband 15 x  Orange  X    Gastroc stretch with towel 3 x 30 sec      Seated HR/TR 15 x      Seated BAPS board L2, fwd/bwd, lateral, CW, CCW 15 x   Cues for isolated movement at ankle          Standing:        Standing gastroc and soleus stretch with L foot back 3 x 30 sec  X    Standing HR/TR 2x10  X    Standing 3 way hip kicks, marches, mini squats  10x each B  X    Standing rockerboard taps/balance (front/back, lateral) 10x taps  30 sec bal X BUE support with taps, 0-1 UE support with balance   Tandem stance- B lead  2x B  30 sec  X    Feet together EC  2x 30 sec  X    Step ups: forward/lateral B lead  10x ea B  4 inch  X B UE support    Dynamic gait: tandem walking, marches, side stepping, backward, grapevine 2 laps ea // bars  X Light use of UE support   Bap (level 2) x10  Front to back, side to side, clockwise, and counterclockwise 10x  X           NuStep S9, A10 level 1 5 minutes  X             Specific Interventions Next Treatment: Left ankle stretches and strengthening, ambulation, balance, modalities as needed. Activity/Treatment Tolerance:  [x]  Patient tolerated treatment well  []  Patient limited by fatigue  []  Patient limited by pain   []  Patient limited by medical complications  []  Other:     Assessment: Added in NuStep and more standing activities today. Yuly Myers reporting pain increased to a 3-4/10. Body Structures/Functions/Activity Limitations: impaired activity tolerance, impaired balance, impaired endurance, impaired ROM, impaired sensation, impaired strength, pain and abnormal gait  Prognosis: good    GOALS:  Patient Goal: \"Try to get all my strength back. \"    Short Term Goals:  Time Frame: 4 weeks  1. Improve LEFS to 46 or greater to assist with return to work.   2.  Patient able to complete all exercises in therapy with no onset of pain to assist with walking into house.  3.  Improve balance to allow Shira Thomas to left SLS 15 seconds to assist with getting dressed. 4.  Increase left ankle ROM to 6 degrees dorsiflexion to assist with ambulation. 5.  Increase left ankle strength to 5/5 to assist with stability walking on unlevel surfaces. Long Term Goals:  Time Frame: 12 weeks  1. Independent with HEP and with progression to assist with decreasing pain and increasing ROM. Patient Education:    [x]  HEP/Education Completed: NuStep, standing [facility] Access Code: [email or web address]  []  No new Education completed  [x]  Reviewed Prior HEP      [x]  Patient verbalized and/or demonstrated understanding of education provided. []  Patient unable to verbalize and/or demonstrate understanding of education provided. Will continue education. [x]  Barriers to learning: none per patient    PLAN:  Treatment Recommendations: Strengthening, Range of Motion, Balance Training, Functional Mobility Training, Transfer Training, Endurance Training, Gait Training, Stair Training, Pain Management, Home Exercise Program, Patient Education, Safety Education and Training and Modalities    []  Plan of care initiated. Plan to see patient 2 times per week for 12 weeks to address the treatment planned outlined above.   [x]  Continue with current plan of care  []  Modify plan of care as follows:    []  Hold pending physician visit  []  Discharge    Time In 1200   Time Out 1240   Timed Code Minutes: 40   Total Treatment Time: 40       Electronically Signed by: Christiana Welch PT

## 2022-03-08 ENCOUNTER — HOSPITAL ENCOUNTER (OUTPATIENT)
Dept: PHYSICAL THERAPY | Age: 47
Setting detail: THERAPIES SERIES
Discharge: HOME | End: 2022-03-08
Payer: MEDICAID

## 2022-03-08 PROCEDURE — 97110 THERAPEUTIC EXERCISES: CPT

## 2022-03-08 NOTE — PROGRESS NOTES
[address] [facility]  PHYSICAL THERAPY  [] EVALUATION  [x] DAILY NOTE (LAND) [] DAILY NOTE (AQUATIC ) [] PROGRESS NOTE [] DISCHARGE NOTE    [x] OUTPATIENT REHABILITATION CENTER [facility]   [] [place] [address]    [] [facility]    [] [employer]     Date: 3/8/2022     Patient Name:  Matt Beach  : 1975  MRN: [id number]  CSN: [id number]    Referring Practitioner Deb Parson MD   Diagnosis Encounter for other orthopedic aftercare [Z47.89]  Achilles tendinitis, left leg [M76.62]    Treatment Diagnosis Difficulty with ambulation   Date of Evaluation 22    Additional Pertinent History Full WB in shoe, high BP      Functional Outcome Measure Used LEFS   Functional Outcome Score 43 (22)       Insurance: Primary: Payor: Jamal Mcallister /  /  / ,   Secondary:    Authorization Information: INSURANCE THERAPY BENEFIT: No precert until after 50HQ visit. Visit Limit Based on Precert  AQUATIC THERAPY COVERED:   Yes  MODALITIES COVERED:  Yes except for Iontophoresis and Hot/Cold Packs. TELEHEALTH COVERED: Yes   Visit # 5, 5/10 for progress note   Visits Allowed: 30   Recertification Date: 1/15/84   Physician Follow-Up: 2022   Physician Orders: Stretching, theraband, modalities   History of Present Illness: On  patient had surgery for left torn achilles tendon. Things are going well since surgery. Little pain. Does have a limp with walking. Has been working on ROM at home. Has been able to wear a shoe for the past 2 days. SUBJECTIVE: Patient reports that he had a little soreness after his last session. He denies any pain currently. He reports compliance with his HEP.      Objective:   TREATMENT   Precautions: Full WB in shoe, high BP   Pain: Denies currently    X in shaded column indicates activity completed today   Modalities Parameters/  Location  Notes                     Manual Therapy Time/Technique  Notes Exercise/Intervention   Notes   Seated: Ankle AROM DF/PF/inv/evr 15 x      Ankle circles: CW/CCW 15 x      Ankle ABCs 1 x      Ankle 4 way theraband 15 x  Orange      Gastroc stretch with towel 3 x 30 sec      Seated HR/TR 15 x      Seated BAPS board L2, fwd/bwd, lateral, CW, CCW 15 x   Cues for isolated movement at ankle          Standing:        Standing gastroc and soleus stretch with L foot back 3 x 30 sec  X    Standing HR/TR 2x10  X    Standing 3 way hip kicks, marches, mini squats  10x each B  X    Standing rockerboard taps/balance (front/back, lateral) 10x taps  30 sec bal  BUE support with taps, 0-1 UE support with balance   Tandem stance- B lead on foam  1x B on level ground  1x B on foam 30 sec  X Performed both sets with foam NEXT SESSION   Feet together EC- on foam  2x 30 sec  X    Step ups: forward/lateral B lead  10x ea B  4 inch  X B UE support    Dynamic gait: tandem walking, marches, side stepping, backward, grapevine 2 laps ea // bars  X Light use of UE support with tandem stance,  performed in hallway today   Bap (level 2) x10  Front to back, side to side, clockwise, and counterclockwise 10x each   X           NuStep S9, A10 level 3 5 minutes  X             Specific Interventions Next Treatment: Left ankle stretches and strengthening, ambulation, balance, modalities as needed. Activity/Treatment Tolerance:  [x]  Patient tolerated treatment well  []  Patient limited by fatigue  []  Patient limited by pain   []  Patient limited by medical complications  []  Other:     Assessment: Increased resistance with nu-step this session. Added foam with tandem stance and feet together eyes closed exercises to further challenge patient. Patient completed therapeutic exercises as documented above. He was limited with the amount of progressions made and completing all documented exercises today due to being scheduled for a shorter treatment session.  He was provided with occasional cues throughout session on technique with exercises to ensure maximal muscle activation. He reported that the gastroc and soleus stretch made him a little sore. He tolerated treatment session well overall. He rated his left achilles pain a 2/10 at the conclusion of session. Body Structures/Functions/Activity Limitations: impaired activity tolerance, impaired balance, impaired endurance, impaired ROM, impaired sensation, impaired strength, pain and abnormal gait   Prognosis: good    GOALS:  Patient Goal: \"Try to get all my strength back. \"    Short Term Goals:  Time Frame: 4 weeks  1. Improve LEFS to 46 or greater to assist with return to work. 2.  Patient able to complete all exercises in therapy with no onset of pain to assist with walking into house. 3.  Improve balance to allow Ludwin Charter to left SLS 15 seconds to assist with getting dressed. 4.  Increase left ankle ROM to 6 degrees dorsiflexion to assist with ambulation. 5.  Increase left ankle strength to 5/5 to assist with stability walking on unlevel surfaces. Long Term Goals:  Time Frame: 12 weeks  1. Independent with HEP and with progression to assist with decreasing pain and increasing ROM. Patient Education:   [x]  HEP/Education Completed: Monitor response to treatment session and progressions made. Continue HEP.  [employer] Access Code: [email or web address]  []  No new Education completed  [x]  Reviewed Prior HEP      [x]  Patient verbalized and/or demonstrated understanding of education provided. []  Patient unable to verbalize and/or demonstrate understanding of education provided. Will continue education.   [x]  Barriers to learning: none per patient    PLAN:  Treatment Recommendations: Strengthening, Range of Motion, Balance Training, Functional Mobility Training, Transfer Training, Endurance Training, Gait Training, Stair Training, Pain Management, Home Exercise Program, Patient Education, Safety Education and Training and Modalities    []  Plan of care initiated. Plan to see patient 2 times per week for 12 weeks to address the treatment planned outlined above.   [x]  Continue with current plan of care  []  Modify plan of care as follows:    []  Hold pending physician visit  []  Discharge    Time In 1015   Time Out 1045   Timed Code Minutes: 30 min   Total Treatment Time: 30 min       Electronically Signed by: Radha Sutton PTA

## 2022-03-11 ENCOUNTER — APPOINTMENT (OUTPATIENT)
Dept: PHYSICAL THERAPY | Age: 47
End: 2022-03-11
Payer: MEDICAID

## 2022-03-15 ENCOUNTER — HOSPITAL ENCOUNTER (OUTPATIENT)
Dept: PHYSICAL THERAPY | Age: 47
Setting detail: THERAPIES SERIES
End: 2022-03-15
Payer: MEDICAID

## 2022-03-18 ENCOUNTER — HOSPITAL ENCOUNTER (OUTPATIENT)
Dept: PHYSICAL THERAPY | Age: 47
Setting detail: THERAPIES SERIES
Discharge: HOME | End: 2022-03-18
Payer: MEDICAID

## 2022-03-18 PROCEDURE — 97110 THERAPEUTIC EXERCISES: CPT

## 2022-03-18 NOTE — DISCHARGE SUMMARY
[address] [facility]  PHYSICAL THERAPY  [] EVALUATION  [] DAILY NOTE (LAND) [] DAILY NOTE (AQUATIC ) [] PROGRESS NOTE [x] DISCHARGE NOTE    [x] OUTPATIENT [facility]ABILITATION [facility] [place]   [] [place] [address]    [] [facility]    [] [facility]     Date: 3/18/2022     Patient Name:  Kasia Washburn  : 1975  MRN: [id number]  CSN: [id number]    Referring Practitioner Kadi Ravi MD   Diagnosis Encounter for other orthopedic aftercare [Z47.89]  Achilles tendinitis, left leg [M76.62]    Treatment Diagnosis Difficulty with ambulation   Date of Evaluation 22    Additional Pertinent History Full WB in shoe, high BP      Functional Outcome Measure Used LEFS   Functional Outcome Score 43 (22)       Insurance: Primary: Payor: Yayo Mazariegos /  /  / ,   Secondary:    Authorization Information: INSURANCE THERAPY BENEFIT: No precert until after 13AU visit. Visit Limit Based on Precert  AQUATIC THERAPY COVERED:   Yes  MODALITIES COVERED:  Yes except for Iontophoresis and Hot/Cold Packs. TELEHEALTH COVERED: Yes   Visit # 6, 6/6 for progress note   Visits Allowed: 30   Recertification Date: 85   Physician Follow-Up: 2022   Physician Orders: Stretching, theraband, modalities   History of Present Illness: On  patient had surgery for left torn achilles tendon. Things are going well since surgery. Little pain. Does have a limp with walking. Has been working on ROM at home. Has been able to wear a shoe for the past 2 days. SUBJECTIVE: Patient reports he has not had any pain in a couple days. 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 At this time he feels around 90% back to normal.  At this time he feels comfortable to be finished with therapy and is to continue with exercises on his own.       Objective:   TREATMENT   Precautions: Full WB in shoe, high BP   Pain: Denies currently on own. Issued CadenceMD theraband today. Body Structures/Functions/Activity Limitations: impaired activity tolerance, impaired balance, impaired endurance, impaired ROM, impaired sensation, impaired strength, pain and abnormal gait   Prognosis: good    GOALS:  Patient Goal: \"Try to get all my strength back. \"    Short Term Goals:  Time Frame: 4 weeks  1. Improve LEFS to 46 or greater to assist with return to work. MET: LEFS 59.  2.  Patient able to complete all exercises in therapy with no onset of pain to assist with walking into house. MET: Last had pain in a couple days. 3.  Improve balance to allow Alanna Reese to left SLS 15 seconds to assist with getting dressed. MET: Left SLS=20 sec. 4.  Increase left ankle ROM to 6 degrees dorsiflexion to assist with ambulation. MET: Left ankle ROM DF=10, PF=52 degrees. 5.  Increase left ankle strength to 5/5 to assist with stability walking on unlevel surfaces. MET: Left ankle strength=5/5. Long Term Goals:  Time Frame: 12 weeks  1. Independent with HEP and with progression to assist with decreasing pain and increasing ROM. MET: Completing HEP 2 times per day. Patient Education:   [x]  HEP/Education Completed: Issued green theraband. Continue HEP.  [employer] Access Code: [email or web address]  []  No new Education completed  [x]  Reviewed Prior HEP      [x]  Patient verbalized and/or demonstrated understanding of education provided. []  Patient unable to verbalize and/or demonstrate understanding of education provided. Will continue education. [x]  Barriers to learning: none per patient    PLAN:  Treatment Recommendations: Strengthening, Range of Motion, Balance Training, Functional Mobility Training, Transfer Training, Endurance Training, Gait Training, Stair Training, Pain Management, Home Exercise Program, Patient Education, Safety Education and Training and Modalities    []  Plan of care initiated.   Plan to see patient 2 times per week for 12 weeks to address the treatment planned outlined above.   []  Continue with current plan of care  []  Modify plan of care as follows:    []  Hold pending physician visit  [x]  Discharge    Time In 1200   Time Out 1230   Timed Code Minutes: 30 min   Total Treatment Time: 30 min       Electronically Signed by: Andreina Foley PT

## 2022-08-15 ENCOUNTER — OFFICE VISIT (OUTPATIENT)
Dept: FAMILY MEDICINE CLINIC | Age: 47
End: 2022-08-15
Payer: MEDICAID

## 2022-08-15 VITALS
DIASTOLIC BLOOD PRESSURE: 70 MMHG | SYSTOLIC BLOOD PRESSURE: 108 MMHG | WEIGHT: 175.2 LBS | RESPIRATION RATE: 12 BRPM | HEIGHT: 69 IN | HEART RATE: 85 BPM | OXYGEN SATURATION: 98 % | BODY MASS INDEX: 25.95 KG/M2 | TEMPERATURE: 98.2 F

## 2022-08-15 DIAGNOSIS — Z12.11 SCREENING FOR COLON CANCER: ICD-10-CM

## 2022-08-15 DIAGNOSIS — R42 DIZZINESS: ICD-10-CM

## 2022-08-15 DIAGNOSIS — R73.09 ELEVATED GLUCOSE: Primary | ICD-10-CM

## 2022-08-15 LAB — HBA1C MFR BLD: 5.5 % (ref 4.3–5.7)

## 2022-08-15 PROCEDURE — 4004F PT TOBACCO SCREEN RCVD TLK: CPT | Performed by: NURSE PRACTITIONER

## 2022-08-15 PROCEDURE — 99213 OFFICE O/P EST LOW 20 MIN: CPT | Performed by: NURSE PRACTITIONER

## 2022-08-15 PROCEDURE — G8427 DOCREV CUR MEDS BY ELIG CLIN: HCPCS | Performed by: NURSE PRACTITIONER

## 2022-08-15 PROCEDURE — G8419 CALC BMI OUT NRM PARAM NOF/U: HCPCS | Performed by: NURSE PRACTITIONER

## 2022-08-15 SDOH — ECONOMIC STABILITY: FOOD INSECURITY: WITHIN THE PAST 12 MONTHS, THE FOOD YOU BOUGHT JUST DIDN'T LAST AND YOU DIDN'T HAVE MONEY TO GET MORE.: NEVER TRUE

## 2022-08-15 SDOH — ECONOMIC STABILITY: FOOD INSECURITY: WITHIN THE PAST 12 MONTHS, YOU WORRIED THAT YOUR FOOD WOULD RUN OUT BEFORE YOU GOT MONEY TO BUY MORE.: NEVER TRUE

## 2022-08-15 ASSESSMENT — PATIENT HEALTH QUESTIONNAIRE - PHQ9
2. FEELING DOWN, DEPRESSED OR HOPELESS: 0
SUM OF ALL RESPONSES TO PHQ QUESTIONS 1-9: 0
1. LITTLE INTEREST OR PLEASURE IN DOING THINGS: 0
SUM OF ALL RESPONSES TO PHQ QUESTIONS 1-9: 0
SUM OF ALL RESPONSES TO PHQ9 QUESTIONS 1 & 2: 0
SUM OF ALL RESPONSES TO PHQ QUESTIONS 1-9: 0
SUM OF ALL RESPONSES TO PHQ QUESTIONS 1-9: 0

## 2022-08-15 ASSESSMENT — SOCIAL DETERMINANTS OF HEALTH (SDOH): HOW HARD IS IT FOR YOU TO PAY FOR THE VERY BASICS LIKE FOOD, HOUSING, MEDICAL CARE, AND HEATING?: NOT HARD AT ALL

## 2022-08-15 NOTE — PROGRESS NOTES
Chief Complaint   Patient presents with    Dizziness     Saturday. History obtained from chart review and the patient. SUBJECTIVE:  Casey Correa is a 56 y.o. male that presents today for episode of dizziness    Patient had finished eating on Saturday and was sitting on the cough. He started feeling very dizzy, like he was spinning. His mom checked his sugar and his sugar was 156. The dizziness lasted about 10 seconds and then passed, but he still didn't feel right. He did have some nausea. No CP/SOB or heart palpitations during the episode.     Age/Gender Health Maintenance    Lipid -   Lab Results   Component Value Date    CHOL 145 10/08/2015     Lab Results   Component Value Date    TRIG 94 10/08/2015     Lab Results   Component Value Date    HDL 41 10/08/2015     Lab Results   Component Value Date    LDLCALC 85 10/08/2015     DM Screen -   Lab Results   Component Value Date    LABA1C 5.5 08/15/2022     No results found for: EAG  Colon Cancer Screening - 39  Lung Cancer Screening (Age 54 to [de-identified] with 30 pack year hx, current smoker or quit within past 15 years) -48    Tetanus - needs  Influenza Vaccine - yearly  Pneumonia Vaccine - 65  Zostavax - 50  HPV Vaccine - n/a    PSA testing discussion - 55  AAA Screening -65    Falls screening - n/a    Current Outpatient Medications   Medication Sig Dispense Refill    amLODIPine (NORVASC) 10 MG tablet Take 1 tablet by mouth daily 90 tablet 3    hydroCHLOROthiazide (HYDRODIURIL) 12.5 MG tablet Take 1 tablet by mouth daily 90 tablet 3    fluticasone (FLONASE) 50 MCG/ACT nasal spray 1 spray by Each Nostril route daily 16 g 0    omeprazole (PRILOSEC) 10 MG delayed release capsule Take 10 mg by mouth daily      acetaminophen (TYLENOL) 500 MG tablet Take 1,000 mg by mouth every 6 hours as needed for Pain      Multiple Vitamin (MULTI VITAMIN DAILY PO) Take by mouth      aspirin (ASPIRIN CHILDRENS) 81 MG chewable tablet Take 1 tablet by mouth daily 30 tablet 3 Elastic Bandages & Supports (JOBST KNEE HIGH COMPRESSION SM) MISC 1 each by Does not apply route daily as needed (edema) (Patient not taking: Reported on 8/15/2022) 1 each 0     No current facility-administered medications for this visit. No orders of the defined types were placed in this encounter. All medications reviewed and reconciled, including OTC and herbal medications. Updated list given to patient. Patient Active Problem List   Diagnosis    Chest pain    Essential hypertension    Tobacco use disorder    Gastroesophageal reflux disease without esophagitis       Past Medical History:   Diagnosis Date    Blind     left eye    Essential hypertension 6/21/2017    Gastroesophageal reflux disease without esophagitis 1/29/2018    Gastroesophageal reflux disease without esophagitis 1/29/2018    Hypertension     10/7/2015    Tobacco use disorder 1/23/2018       Past Surgical History:   Procedure Laterality Date    EYE SURGERY      Glass eye placement, left eye. No Known Allergies    Social History     Socioeconomic History    Marital status: Single     Spouse name: Not on file    Number of children: Not on file    Years of education: Not on file    Highest education level: Not on file   Occupational History    Not on file   Tobacco Use    Smoking status: Every Day     Packs/day: 0.50     Years: 22.00     Pack years: 11.00     Types: Cigarettes    Smokeless tobacco: Never   Vaping Use    Vaping Use: Never used   Substance and Sexual Activity    Alcohol use:  Yes     Alcohol/week: 24.0 standard drinks     Types: 24 Cans of beer per week     Comment: socially    Drug use: No    Sexual activity: Yes     Partners: Female   Other Topics Concern    Not on file   Social History Narrative    Not on file     Social Determinants of Health     Financial Resource Strain: Low Risk     Difficulty of Paying Living Expenses: Not hard at all   Food Insecurity: No Food Insecurity    Worried About Running Out of Food in the Last Year: Never true    Ran Out of Food in the Last Year: Never true   Transportation Needs: Not on file   Physical Activity: Not on file   Stress: Not on file   Social Connections: Not on file   Intimate Partner Violence: Not on file   Housing Stability: Not on file       Family History   Problem Relation Age of Onset    Heart Disease Father     High Blood Pressure Father     High Blood Pressure Mother          I have reviewed the patient's past medical history, past surgical history, allergies, medications, social and family history and I have made updates where appropriate.       Review of Systems  Positive responses are highlighted in bold    Constitutional:  Fever, Chills, Night Sweats, Fatigue, Unexpected changes in weight  Eyes:  Eye discharge, Eye pain, Eye redness, Visual disturbances   HENT:  Ear pain, Tinnitus, Nosebleeds, Trouble swallowing, Hearing loss, Sore throat  Cardiovascular:  Chest Pain, Palpitations, Orthopnea, Paroxysmal Nocturnal Dyspnea  Respiratory:  Cough, Wheezing, Shortness of breath, Chest tightness, Apnea  Gastrointestinal:  Nausea, Vomiting, Diarrhea, Constipation, Heartburn, Blood in stool  Genitourinary:  Difficulty or painful urination, Flank pain, Change in frequency, Urgency  Skin:  Color change, Rash, Itching, Wound  Psychiatric:  Hallucinations, Anxiety, Depression, Suicidal ideation  Hematological:  Enlarged glands, Easy bleeding, Easily bruising  Musculoskeletal:  Joint pain, Back pain, Gait problems, Joint swelling, Myalgias  Neurological:  Dizziness, Headaches, Presyncope, Numbness, Seizures, Tremors  Allergy:  Environmental allergies, Food allergies  Endocrine:  Heat Intolerance, Cold Intolerance, Polydipsia, Polyphagia, Polyuria    Lab Results   Component Value Date     03/09/2021    K 4.2 03/09/2021     03/09/2021    CO2 26 03/09/2021    BUN 14 03/09/2021    CREATININE 0.7 03/09/2021    GLUCOSE 103 03/09/2021    CALCIUM 9.6 03/09/2021    PROT 6.9 03/09/2021    LABALBU 4.6 03/09/2021    BILITOT 0.5 03/09/2021    ALKPHOS 54 03/09/2021    AST 15 03/09/2021    ALT 21 03/09/2021    LABGLOM >90 03/09/2021     Lab Results   Component Value Date    WBC 7.6 03/09/2021    HGB 15.4 03/09/2021    HCT 45.9 03/09/2021    MCV 95.4 (H) 03/09/2021     03/09/2021     PHYSICAL EXAM:  Vitals:    08/15/22 0941   BP: 108/70   Pulse: 85   Resp: 12   Temp: 98.2 °F (36.8 °C)   TempSrc: Oral   SpO2: 98%   Weight: 175 lb 3.2 oz (79.5 kg)   Height: 5' 9\" (1.753 m)     Body mass index is 25.87 kg/m². VS Reviewed  General Appearance: A&O x 3, No acute distress,well developed and well- nourished  Head: normocephalic and atraumatic  Eyes: pupils equal, round, and reactive to light, extraocular eye movements intact, conjunctivae and eye lids without erythema  Neck: supple and non-tender without mass, no thyromegaly or thyroid nodules, no cervical lymphadenopathy  Pulmonary/Chest: clear to auscultation bilaterally- no wheezes, rales or rhonchi, normal air movement, no respiratory distress or retractions  Cardiovascular: S1 and S2 auscultated w/ RRR. No murmurs, rubs, clicks, or gallops, distal pulses intact. Abdomen: soft, non-tender, non-distended, bowl sounds physiologic,  no rebound or guarding, no masses or hernias noted. Liver and spleen without enlargement. Extremities: no cyanosis, clubbing or edema of the lower extremities  Musculoskeletal: No joint swelling or gross deformity   Neuro:  Alert, 5/5 strength globally and symmetrically, + Kesha hallpike right ear  Psych: Affect appropriate. Mood normal. Thought process is normal without evidence of depression or psychosis. Good insight and appropriate interaction. Cognition and memory appear to be intact. Skin: warm and dry, no rash or erythema  Lymph:  No cervical, auricular or supraclavicular lymph nodes palpated    ASSESSMENT & PLAN  [facility] was seen today for dizziness.     Diagnoses and all orders for this visit:    Elevated glucose  -     POCT glycosylated hemoglobin (Hb A1C)    Dizziness    Screening for colon cancer  -     Fecal DNA Colorectal cancer screening (Cologuard); Future    - A1C 5.5 which is in normal range  - dizziness consistent with BPPV-Cawthorne exercises given    DISPOSITION    Return if symptoms worsen or fail to improve. Beatris Riggins released without restrictions. PATIENT COUNSELING    Counseling was provided today regarding the following topics: Healthy eating habits, Regular exercise, substance abuse and healthy sleep habits. Beatris Riggins received counseling on the following healthy behaviors: exercise and medication adherence    Patient given educational materials on: See Attached    I have instructed Beatris Riggins to complete a self tracking handout on none and instructed them to bring it with them to his next appointment. Barriers to learning and self management: none    Discussed use, benefit, and side effects of prescribed medications. Barriers to medication compliance addressed. All patient questions answered. Pt voiced understanding.        Electronically signed by MARK Valdovinos CNP on 8/15/2022 at 10:00 AM

## 2022-11-07 DIAGNOSIS — I10 ESSENTIAL HYPERTENSION: ICD-10-CM

## 2022-11-08 RX ORDER — HYDROCHLOROTHIAZIDE 12.5 MG/1
TABLET ORAL
Qty: 90 TABLET | Refills: 3 | Status: SHIPPED | OUTPATIENT
Start: 2022-11-08

## 2022-11-08 RX ORDER — AMLODIPINE BESYLATE 10 MG/1
TABLET ORAL
Qty: 90 TABLET | Refills: 3 | Status: SHIPPED | OUTPATIENT
Start: 2022-11-08

## 2022-11-08 NOTE — TELEPHONE ENCOUNTER
Recent Visits  Date Type Provider Dept   08/15/22 Office Visit MARK Cruz CNP [facility]   11/09/21 Office Visit MARK Cruz CNP [facility]   Showing recent visits within past 540 days with a meds authorizing provider and meeting all other requirements  Future Appointments  Date Type Provider Dept   11/09/22 Appointment MARK Cruz CNP [facility]   Showing future appointments within next 150 days with a meds authorizing provider and meeting all other requirements     Future Appointments   Date Time Provider Elvin Rudolph   11/9/2022  8:20 AM MARK Cruz

## 2023-01-26 ENCOUNTER — HOSPITAL ENCOUNTER (EMERGENCY)
Age: 48
Discharge: HOME | End: 2023-01-26
Attending: EMERGENCY MEDICINE
Payer: MEDICAID

## 2023-01-26 ENCOUNTER — APPOINTMENT (OUTPATIENT)
Dept: GENERAL RADIOLOGY | Age: 48
End: 2023-01-26
Payer: MEDICAID

## 2023-01-26 VITALS
HEART RATE: 73 BPM | TEMPERATURE: 97.8 F | DIASTOLIC BLOOD PRESSURE: 86 MMHG | BODY MASS INDEX: 25.92 KG/M2 | OXYGEN SATURATION: 95 % | SYSTOLIC BLOOD PRESSURE: 115 MMHG | WEIGHT: 175 LBS | HEIGHT: 69 IN | RESPIRATION RATE: 22 BRPM

## 2023-01-26 DIAGNOSIS — R07.89 ATYPICAL CHEST PAIN: Primary | ICD-10-CM

## 2023-01-26 LAB
ALBUMIN SERPL BCG-MCNC: 4.6 G/DL (ref 3.5–5.1)
ALP SERPL-CCNC: 62 U/L (ref 38–126)
ALT SERPL W/O P-5'-P-CCNC: 21 U/L (ref 11–66)
ANION GAP SERPL CALC-SCNC: 10 MEQ/L (ref 8–16)
AST SERPL-CCNC: 11 U/L (ref 5–40)
BASOPHILS ABSOLUTE: 0.1 THOU/MM3 (ref 0–0.1)
BASOPHILS NFR BLD AUTO: 1 %
BILIRUB CONJ SERPL-MCNC: < 0.2 MG/DL (ref 0–0.3)
BILIRUB SERPL-MCNC: 0.3 MG/DL (ref 0.3–1.2)
BUN SERPL-MCNC: 14 MG/DL (ref 7–22)
CALCIUM SERPL-MCNC: 9.4 MG/DL (ref 8.5–10.5)
CHLORIDE SERPL-SCNC: 102 MEQ/L (ref 98–111)
CO2 SERPL-SCNC: 26 MEQ/L (ref 23–33)
CREAT SERPL-MCNC: 0.8 MG/DL (ref 0.4–1.2)
DEPRECATED RDW RBC AUTO: 43.1 FL (ref 35–45)
EOSINOPHIL NFR BLD AUTO: 1.2 %
EOSINOPHILS ABSOLUTE: 0.1 THOU/MM3 (ref 0–0.4)
ERYTHROCYTE [DISTWIDTH] IN BLOOD BY AUTOMATED COUNT: 12.6 % (ref 11.5–14.5)
GFR SERPL CREATININE-BSD FRML MDRD: > 60 ML/MIN/1.73M2
GLUCOSE SERPL-MCNC: 110 MG/DL (ref 70–108)
HCT VFR BLD AUTO: 47.5 % (ref 42–52)
HGB BLD-MCNC: 16 GM/DL (ref 14–18)
IMM GRANULOCYTES # BLD AUTO: 0.03 THOU/MM3 (ref 0–0.07)
IMM GRANULOCYTES NFR BLD AUTO: 0.4 %
LIPASE SERPL-CCNC: 23.5 U/L (ref 5.6–51.3)
LYMPHOCYTES ABSOLUTE: 1.8 THOU/MM3 (ref 1–4.8)
LYMPHOCYTES NFR BLD AUTO: 22.8 %
MCH RBC QN AUTO: 31.6 PG (ref 26–33)
MCHC RBC AUTO-ENTMCNC: 33.7 GM/DL (ref 32.2–35.5)
MCV RBC AUTO: 93.7 FL (ref 80–94)
MONOCYTES ABSOLUTE: 0.4 THOU/MM3 (ref 0.4–1.3)
MONOCYTES NFR BLD AUTO: 4.6 %
NEUTROPHILS NFR BLD AUTO: 70 %
NRBC BLD AUTO-RTO: 0 /100 WBC
OSMOLALITY SERPL CALC.SUM OF ELEC: 276.8 MOSMOL/KG (ref 275–300)
PLATELET # BLD AUTO: 343 THOU/MM3 (ref 130–400)
PMV BLD AUTO: 8.5 FL (ref 9.4–12.4)
POTASSIUM SERPL-SCNC: 4.3 MEQ/L (ref 3.5–5.2)
PROT SERPL-MCNC: 7.3 G/DL (ref 6.1–8)
RBC # BLD AUTO: 5.07 MILL/MM3 (ref 4.7–6.1)
SEGMENTED NEUTROPHILS ABSOLUTE COUNT: 5.7 THOU/MM3 (ref 1.8–7.7)
SODIUM SERPL-SCNC: 138 MEQ/L (ref 135–145)
TROPONIN T: < 0.01 NG/ML
TROPONIN T: < 0.01 NG/ML
WBC # BLD AUTO: 8.1 THOU/MM3 (ref 4.8–10.8)

## 2023-01-26 PROCEDURE — 82248 BILIRUBIN DIRECT: CPT

## 2023-01-26 PROCEDURE — 71046 X-RAY EXAM CHEST 2 VIEWS: CPT

## 2023-01-26 PROCEDURE — 83690 ASSAY OF LIPASE: CPT

## 2023-01-26 PROCEDURE — 6360000002 HC RX W HCPCS: Performed by: EMERGENCY MEDICINE

## 2023-01-26 PROCEDURE — 84484 ASSAY OF TROPONIN QUANT: CPT

## 2023-01-26 PROCEDURE — 93010 ELECTROCARDIOGRAM REPORT: CPT | Performed by: INTERNAL MEDICINE

## 2023-01-26 PROCEDURE — 99285 EMERGENCY DEPT VISIT HI MDM: CPT

## 2023-01-26 PROCEDURE — 85025 COMPLETE CBC W/AUTO DIFF WBC: CPT

## 2023-01-26 PROCEDURE — 93005 ELECTROCARDIOGRAM TRACING: CPT | Performed by: EMERGENCY MEDICINE

## 2023-01-26 PROCEDURE — 6370000000 HC RX 637 (ALT 250 FOR IP): Performed by: EMERGENCY MEDICINE

## 2023-01-26 PROCEDURE — 80053 COMPREHEN METABOLIC PANEL: CPT

## 2023-01-26 PROCEDURE — 96374 THER/PROPH/DIAG INJ IV PUSH: CPT

## 2023-01-26 PROCEDURE — 36415 COLL VENOUS BLD VENIPUNCTURE: CPT

## 2023-01-26 RX ORDER — LIDOCAINE 50 MG/G
1 PATCH TOPICAL DAILY
Qty: 5 PATCH | Refills: 0 | Status: SHIPPED | OUTPATIENT
Start: 2023-01-26 | End: 2023-01-31

## 2023-01-26 RX ORDER — KETOROLAC TROMETHAMINE 30 MG/ML
15 INJECTION, SOLUTION INTRAMUSCULAR; INTRAVENOUS ONCE
Status: COMPLETED | OUTPATIENT
Start: 2023-01-26 | End: 2023-01-26

## 2023-01-26 RX ORDER — NITROGLYCERIN 0.4 MG/1
0.4 TABLET SUBLINGUAL EVERY 5 MIN PRN
Status: DISCONTINUED | OUTPATIENT
Start: 2023-01-26 | End: 2023-01-26 | Stop reason: HOSPADM

## 2023-01-26 RX ORDER — ASPIRIN 81 MG/1
324 TABLET, CHEWABLE ORAL ONCE
Status: COMPLETED | OUTPATIENT
Start: 2023-01-26 | End: 2023-01-26

## 2023-01-26 RX ADMIN — KETOROLAC TROMETHAMINE 15 MG: 30 INJECTION, SOLUTION INTRAMUSCULAR; INTRAVENOUS at 08:48

## 2023-01-26 RX ADMIN — ASPIRIN 324 MG: 81 TABLET, CHEWABLE ORAL at 07:49

## 2023-01-26 RX ADMIN — NITROGLYCERIN 0.4 MG: 0.4 TABLET, ORALLY DISINTEGRATING SUBLINGUAL at 07:49

## 2023-01-26 ASSESSMENT — PAIN SCALES - GENERAL
PAINLEVEL_OUTOF10: 5
PAINLEVEL_OUTOF10: 2

## 2023-01-26 ASSESSMENT — PAIN DESCRIPTION - DESCRIPTORS: DESCRIPTORS: ACHING

## 2023-01-26 ASSESSMENT — PAIN DESCRIPTION - LOCATION
LOCATION: CHEST
LOCATION: CHEST;BACK

## 2023-01-26 ASSESSMENT — HEART SCORE: ECG: 0

## 2023-01-26 ASSESSMENT — PAIN DESCRIPTION - FREQUENCY: FREQUENCY: CONTINUOUS

## 2023-01-26 ASSESSMENT — PAIN - FUNCTIONAL ASSESSMENT: PAIN_FUNCTIONAL_ASSESSMENT: 0-10

## 2023-01-26 NOTE — ED PROVIDER NOTES
[facility] [address] [address] EMERGENCY DEPT    EMERGENCY MEDICINE      Pt Name: Kayden Lopez  MRN: [id number]  [place] 1975  Date of evaluation: 1/26/2023  Treating Resident Physician: Bernardo Fortune MD  Supervising Physician: Prince Guo DO    CHIEF COMPLAINT       Chief Complaint   Patient presents with    Chest Pain    Back Pain     History obtained from chart review and the patient. HISTORY OF PRESENT ILLNESS    HPI    Kayden Lopez is a 56 y.o. male with PMH of HTN presents to the emergency department for evaluation of chest pain that started at 0545 this morning. Patient stated that he woke up this morning at 0430 and noticed that he had pain in his left mid back. He stated that he still tried to go to work and there then he developed pain in the left chest.  He described the pain as an intermittent pressure pain in his L chest at a 4 out of 10 that worsens with movement. He denied radiation of the pain and stated that the pain in his back feels like a sharp pain that worsens with palpation and movement. Patient is denying any nausea, vomiting, shortness of breath, abdominal pain, bowel changes, cough, fever. The patient has no other acute complaints at this time. PAST MEDICAL AND SURGICAL HISTORY     Past Medical History:   Diagnosis Date    Blind     left eye    Essential hypertension 6/21/2017    Gastroesophageal reflux disease without esophagitis 1/29/2018    Gastroesophageal reflux disease without esophagitis 1/29/2018    Hypertension     10/7/2015    Tobacco use disorder 1/23/2018       Past Surgical History:   Procedure Laterality Date    EYE SURGERY      Glass eye placement, left eye.         CURRENT MEDICATIONS     Previous Medications    ACETAMINOPHEN (TYLENOL) 500 MG TABLET    Take 1,000 mg by mouth every 6 hours as needed for Pain    AMLODIPINE (NORVASC) 10 MG TABLET    take 1 tablet by mouth once daily    ASPIRIN (ASPIRIN CHILDRENS) 81 MG CHEWABLE TABLET    Take 1 tablet by mouth daily ELASTIC BANDAGES & SUPPORTS (JOBST KNEE HIGH COMPRESSION SM) MISC    1 each by Does not apply route daily as needed (edema)    FLUTICASONE (FLONASE) 50 MCG/ACT NASAL SPRAY    1 spray by Each Nostril route daily    HYDROCHLOROTHIAZIDE (HYDRODIURIL) 12.5 MG TABLET    take 1 tablet by mouth once daily    MULTIPLE VITAMIN (MULTI VITAMIN DAILY PO)    Take by mouth    OMEPRAZOLE (PRILOSEC) 10 MG DELAYED RELEASE CAPSULE    Take 10 mg by mouth daily       ALLERGIES   No Known Allergies    FAMILY HISTORY     Family History   Problem Relation Age of Onset    Heart Disease Father     High Blood Pressure Father     High Blood Pressure Mother        SOCIAL HISTORY     Social History     Tobacco Use    Smoking status: Every Day     Packs/day: 0.50     Years: 22.00     Pack years: 11.00     Types: Cigarettes    Smokeless tobacco: Never   Vaping Use    Vaping Use: Never used   Substance Use Topics    Alcohol use: Yes     Alcohol/week: 24.0 standard drinks     Types: 24 Cans of beer per week     Comment: socially    Drug use: No       PHYSICAL EXAM     ED Triage Vitals [01/26/23 0711]   BP Temp Temp Source Heart Rate Resp SpO2 Height Weight   (!) 140/105 97.8 °F (36.6 °C) Oral 73 20 99 % 5' 9\" (1.753 m) 175 lb (79.4 kg)     Body mass index is 25.84 kg/m². Initial vital signs and nursing assessment reviewed and abnormal from hypertension . Physical Exam  Vitals and nursing note reviewed. Constitutional:       General: He is not in acute distress. Appearance: Normal appearance. He is not ill-appearing, toxic-appearing or diaphoretic. HENT:      Head: Normocephalic and atraumatic. Right Ear: External ear normal.      Left Ear: External ear normal.      Nose: Nose normal. No congestion or rhinorrhea. Mouth/Throat:      Mouth: Mucous membranes are moist.      Pharynx: Oropharynx is clear. No oropharyngeal exudate or posterior oropharyngeal erythema. Eyes:      General: No scleral icterus.         Right eye: No discharge. Left eye: No discharge. Extraocular Movements: Extraocular movements intact. Conjunctiva/sclera: Conjunctivae normal.      Pupils: Pupils are equal, round, and reactive to light. Cardiovascular:      Rate and Rhythm: Normal rate and regular rhythm. Pulses: Normal pulses. Heart sounds: Normal heart sounds. No murmur heard. No gallop. Pulmonary:      Effort: Pulmonary effort is normal. No respiratory distress. Breath sounds: Normal breath sounds. No stridor. No wheezing, rhonchi or rales. Abdominal:      General: Bowel sounds are normal. There is no distension. Palpations: Abdomen is soft. Tenderness: There is no abdominal tenderness. There is no right CVA tenderness, left CVA tenderness, guarding or rebound. Musculoskeletal:         General: Tenderness (Left back below scapula) present. No swelling, deformity or signs of injury. Cervical back: Neck supple. No rigidity or tenderness. Right lower leg: No edema. Left lower leg: No edema. Lymphadenopathy:      Cervical: No cervical adenopathy. Skin:     General: Skin is warm and dry. Coloration: Skin is not jaundiced or pale. Findings: No bruising, erythema, lesion or rash. Neurological:      General: No focal deficit present. Mental Status: He is alert and oriented to person, place, and time. Mental status is at baseline. Psychiatric:         Mood and Affect: Mood normal.         Behavior: Behavior normal.         Thought Content: Thought content normal.         Judgment: Judgment normal.        FORMAL DIAGNOSTIC RESULTS     RADIOLOGY: Interpretation per the Radiologist below, if available at the time of this note (none if blank):    XR CHEST (2 VW)   Final Result   Normal chest.               **This report has been created using voice recognition software. It may contain minor errors which are inherent in voice recognition technology. **      Final report electronically signed by Dr. Yanna Jordan on 2023 8:02 AM          LABS: (none if blank)  Labs Reviewed   BASIC METABOLIC PANEL - Abnormal; Notable for the following components:       Result Value    Glucose 110 (*)     All other components within normal limits   CBC WITH AUTO DIFFERENTIAL - Abnormal; Notable for the following components:    MPV 8.5 (*)     All other components within normal limits   HEPATIC FUNCTION PANEL   LIPASE   TROPONIN   ANION GAP   OSMOLALITY   GLOMERULAR FILTRATION RATE, ESTIMATED   TROPONIN       (Any cultures that may have been sent were not resulted at the time of this patient visit)    MEDICAL DECISION MAKING     1) Number and Complexity of Problems            Problem List This Visit:         Chief Complaint   Patient presents with    Chest Pain    Back Pain           Differential Diagnosis includes (but not limited to):  Muscle skeletal pain, MI, PE, PNA,        Diagnoses Considered with low index of suspicion:   AAA, aortic dissection             Pertinent Comorbid Conditions:    HTN, smoking    2)  Data Reviewed (none if blank or additional interpretation can be found in ED Course)          My Independent interpretations:     EKG:      ECG showing normal sinus rhythm at rate of 74 bpm with normal intervals no QT prolongation QRS narrow complex biphasic P wave in V1 which is consistent to prior ECG. Imaging: Normal chest x-ray    Labs:      Troponin was negative and second troponin performed 2 hours later also remaining negative. No signs of anemia, electrolyte abnormalities, liver or gallbladder or pancreas abnormalities. Decision Rules/Clinical Scores utilized: PE ruled out via PERC  History: 1  EC  Patient Age: 2  *Risk factors for Atherosclerotic disease: Cigarette smoking;  Hypertension  Risk Factors: 1  Troponin: 0  Heart Score Total: 3             External Documentation Reviewed:   Previous patient encounter documents & history available on EMR was reviewed as available. Kathie Flannery 3)  Treatment and Disposition         ED Reassessment: Patient was given 15 mg Toradol IV and stated that resolved his pain when the nitroglycerin did not improve his pain. Case discussed with consulting clinician: Patient scheduled for follow-up with cardiology         Shared Decision-Making was performed and disposition discussed with the        patient/caregiver at bedside with all questions answered. Social determinants of health impacting treatment or disposition:           Code Status: Full code      Summary of Patient Presentation:      MDM  Number of Diagnoses or Management Options  Atypical chest pain: new, needed workup  Diagnosis management comments: 50-year-old male come to the ED for evaluation of chest pain. Patient describing cute onset left-sided chest pain that feels like a pressure that he has never had this type pain before. He also complaining of back pain. Back pain worsened with palpation on the left side and movement. Suspected musculoskeletal is likely etiology. Cardiac work-up performed for the chest pain with delta troponin included showing negative testing. Patient's heart score is 3 plan pain resolved with Toradol but had no change with nitroglycerin. Discussed findings with patient to after shared decision making agreed to go to follow-up appointment with cardiology. Strict return precautions discussed with patient he was discharged with outpatient follow-up.        Amount and/or Complexity of Data Reviewed  Clinical lab tests: ordered and reviewed  Tests in the radiology section of CPT®: ordered and reviewed  Tests in the medicine section of CPT®: ordered and reviewed  Obtain history from someone other than the patient: yes (Family history obtained from mother who was present)  Review and summarize past medical records: yes  Independent visualization of images, tracings, or specimens: yes    Risk of Complications, Morbidity, and/or Mortality  Presenting problems: high  Diagnostic procedures: moderate  Management options: moderate    Patient Progress  Patient progress: improved  /   Vitals Reviewed:    Vitals:    01/26/23 0711 01/26/23 0846 01/26/23 1001   BP: (!) 140/105 110/83 115/86   Pulse: 73 71 73   Resp: 20 20 22   Temp: 97.8 °F (36.6 °C)     TempSrc: Oral     SpO2: 99% 99% 95%   Weight: 175 lb (79.4 kg)     Height: 5' 9\" (1.753 m)         The patient was seen and examined. Appropriate diagnostic testing was performed and results reviewed with the patient and/or caregivers. The results of pertinent diagnostic studies and exam findings were discussed. The patients provisional diagnosis and plan of care were discussed with the patient and present caregivers who expressed understanding. Any medications were reviewed and indications and risks of medications were discussed. Strict verbal and written return precautions, instructions and appropriate follow-up were provided to the patient. ED Medications administered this visit:  (None if blank)  Medications   nitroGLYCERIN (NITROSTAT) SL tablet 0.4 mg (0.4 mg SubLINGual Given 1/26/23 0749)   aspirin chewable tablet 324 mg (324 mg Oral Given 1/26/23 0749)   ketorolac (TORADOL) injection 15 mg (15 mg IntraVENous Given 1/26/23 0848)       PROCEDURES: (None if blank)  Procedures:     CRITICAL CARE: (None if blank)    DISCHARGE PRESCRIPTIONS: (None if blank)  New Prescriptions    LIDOCAINE (LIDODERM) 5 %    Place 1 patch onto the skin daily for 5 days 12 hours on, 12 hours off. FINAL IMPRESSION     Final diagnoses:   Atypical chest pain     1.  Atypical chest pain        DISPOSITION / PLAN   DISPOSITION Decision To Discharge 01/26/2023 10:00:55 AM      OUTPATIENT FOLLOW UP THE PATIENT:  Saurabh HutchinsonMARK - [facility]  [address]  [address] [address] [phone]    Call   As needed for follow up    [address] [facility]  [address]  [phone]  Go in 4 days  Your scheduled appointment at 9:30 AM Monday the 30th      This transcription was electronically signed. Parts of this transcriptions may have been dictated by use of voice recognition software and electronically transcribed, and parts may have been transcribed with the assistance of an ED scribe. The transcription may contain errors not detected in proofreading. Please refer to my supervising physician's documentation if my documentation differs.     Electronically Signed: Bernardo Fortune MD, 01/26/23, 10:32 Rima Matute MD  Resident  01/26/23 1032

## 2023-01-26 NOTE — DISCHARGE INSTRUCTIONS
You are seen in the ER today for chest pain. No obvious sign of abnormalities in your heart or lungs were noted. We are suspecting this is most likely muscular cause but I would like you to follow-up with cardiology to the scheduled follow-up appointment on Monday the 30th at 9:30 AM.  If before this time you develop worsening chest pain, shortness of breath, nausea, vomiting, or become sweaty with chest pain please return to the ER for evaluation. Otherwise at this time I recommend following up with cardiology and your primary care provider.

## 2023-01-26 NOTE — ED NOTES
Patient resting comfortably in bed. Patient reports nitro and ASA help slightly with chest and back pain. Patient reporting headache at this time.  Provider notified      Edwige De León RN  01/26/23 0353

## 2023-01-26 NOTE — ED NOTES
Pt rprts decreased chest pain. Rates 2/10. Rprts back pain 4/10. Better than when he first came in. A&O x4. Breathing easy and unlabored on RA. Denies all other needs at this time.       Jeff Souza RN  01/26/23 1012

## 2023-01-26 NOTE — ED PROVIDER NOTES
[address] \[facility]\"" [address] [address] EMERGENCY DEPT  ED Attending Physician Attestation     I performed a history and physical examination of the patient and discussed management with the Resident. I reviewed the Resident's note and agree with the documented findings and plan of care. Any areas of disagreement are noted on the chart. I was personally present for the key portions of any procedures and have documented in the chart those procedures where I was not present during the key portions. I have reviewed the emergency nurses triage note and agree with the chief complaint, past medical history, past surgical history, allergies, medications, social and family history as documented unless otherwise noted below.     Awoke with pain in the left periscapular area that radiates forward  No SOB, no cough, no LE pain or swelling  No hx of PE/DVT; no fam hx of ischemic heart dzs  Pain is worse with any movement; not specifically with exertion    HEENT WNL  Neck supple  Lungs clear  HRRR no mcg  L periscapular area tender to palpation  L chest nontender  Abd SNT/ND no LE pain or swelling    EKG without ischemia  Initial troponin negative  HEART score 3  Overall, does not seem like ischemic etiology and seems musculoskeletal  Plan repeat trop/EKG; analgesia; f/u with cardiology    Manuel Anderson [facility]  Attending Emergency Physician        Bijan Hernandez DO  01/26/23 [id number]

## 2023-01-27 LAB
EKG ATRIAL RATE: 74 BPM
EKG P AXIS: 73 DEGREES
EKG P-R INTERVAL: 154 MS
EKG Q-T INTERVAL: 362 MS
EKG QRS DURATION: 108 MS
EKG QTC CALCULATION (BAZETT): 401 MS
EKG R AXIS: 77 DEGREES
EKG T AXIS: 62 DEGREES
EKG VENTRICULAR RATE: 74 BPM

## 2023-01-30 ENCOUNTER — OFFICE VISIT (OUTPATIENT)
Dept: CARDIOLOGY CLINIC | Age: 48
End: 2023-01-30
Payer: MEDICAID

## 2023-01-30 VITALS
DIASTOLIC BLOOD PRESSURE: 85 MMHG | HEART RATE: 114 BPM | HEIGHT: 69 IN | SYSTOLIC BLOOD PRESSURE: 132 MMHG | BODY MASS INDEX: 26.33 KG/M2 | WEIGHT: 177.8 LBS

## 2023-01-30 DIAGNOSIS — I10 ESSENTIAL HYPERTENSION: ICD-10-CM

## 2023-01-30 DIAGNOSIS — R07.89 CHEST PAIN, ATYPICAL: Primary | ICD-10-CM

## 2023-01-30 DIAGNOSIS — Z82.49 FAMILY HISTORY OF EARLY CAD: ICD-10-CM

## 2023-01-30 DIAGNOSIS — K21.9 GASTROESOPHAGEAL REFLUX DISEASE WITHOUT ESOPHAGITIS: ICD-10-CM

## 2023-01-30 DIAGNOSIS — F17.200 TOBACCO USE DISORDER: ICD-10-CM

## 2023-01-30 PROCEDURE — 3075F SYST BP GE 130 - 139MM HG: CPT | Performed by: INTERNAL MEDICINE

## 2023-01-30 PROCEDURE — G8484 FLU IMMUNIZE NO ADMIN: HCPCS | Performed by: INTERNAL MEDICINE

## 2023-01-30 PROCEDURE — G8419 CALC BMI OUT NRM PARAM NOF/U: HCPCS | Performed by: INTERNAL MEDICINE

## 2023-01-30 PROCEDURE — G8427 DOCREV CUR MEDS BY ELIG CLIN: HCPCS | Performed by: INTERNAL MEDICINE

## 2023-01-30 PROCEDURE — 99204 OFFICE O/P NEW MOD 45 MIN: CPT | Performed by: INTERNAL MEDICINE

## 2023-01-30 PROCEDURE — 3079F DIAST BP 80-89 MM HG: CPT | Performed by: INTERNAL MEDICINE

## 2023-01-30 PROCEDURE — 4004F PT TOBACCO SCREEN RCVD TLK: CPT | Performed by: INTERNAL MEDICINE

## 2023-01-30 NOTE — PROGRESS NOTES
Chief Complaint   Patient presents with    Follow-Up from Hospital     Last see  by cardiology nov 2019  Used to see  [facility] for HTN  Patient presents here today for a hospital follow up. For chest pain jan 26.2023  Chest pain was burning,. 4/10,nonradiating, last 20 min and resolved after asa and SL NTG in the ER, no associated sob or sweatying  He had an upper back pain also associated  Had similar cp before-7 months back    No more chest pain after    Denied sob, palpitation, edema or dizziness    EKG done 1/26/2023    Smoke 1ppd for 27 yrs    FHx  Grandfather had MI at 54    Past Surgical History:   Procedure Laterality Date    EYE SURGERY      Glass eye placement, left eye. No Known Allergies     Family History   Problem Relation Age of Onset    Heart Disease Father     High Blood Pressure Father     High Blood Pressure Mother         Social History     Socioeconomic History    Marital status: Single     Spouse name: Not on file    Number of children: Not on file    Years of education: Not on file    Highest education level: Not on file   Occupational History    Not on file   Tobacco Use    Smoking status: Every Day     Packs/day: 0.50     Years: 22.00     Pack years: 11.00     Types: Cigarettes    Smokeless tobacco: Never   Vaping Use    Vaping Use: Never used   Substance and Sexual Activity    Alcohol use:  Yes     Alcohol/week: 24.0 standard drinks     Types: 24 Cans of beer per week     Comment: socially    Drug use: No    Sexual activity: Yes     Partners: Female   Other Topics Concern    Not on file   Social History Narrative    Not on file     Social Determinants of Health     Financial Resource Strain: Low Risk     Difficulty of Paying Living Expenses: Not hard at all   Food Insecurity: No Food Insecurity    Worried About Running Out of Food in the Last Year: Never true    Ran Out of Food in the Last Year: Never true   Transportation Needs: Not on file   Physical Activity: Not on file   Stress: Not on file   Social Connections: Not on file   Intimate Partner Violence: Not on file   Housing Stability: Not on file       Current Outpatient Medications   Medication Sig Dispense Refill    lidocaine (LIDODERM) 5 % Place 1 patch onto the skin daily for 5 days 12 hours on, 12 hours off. 5 patch 0    hydroCHLOROthiazide (HYDRODIURIL) 12.5 MG tablet take 1 tablet by mouth once daily 90 tablet 3    amLODIPine (NORVASC) 10 MG tablet take 1 tablet by mouth once daily 90 tablet 3    fluticasone (FLONASE) 50 MCG/ACT nasal spray 1 spray by Each Nostril route daily 16 g 0    omeprazole (PRILOSEC) 10 MG delayed release capsule Take 10 mg by mouth daily      acetaminophen (TYLENOL) 500 MG tablet Take 1,000 mg by mouth every 6 hours as needed for Pain      Elastic Bandages & Supports (JOBST KNEE HIGH COMPRESSION SM) MISC 1 each by Does not apply route daily as needed (edema) 1 each 0    Multiple Vitamin (MULTI VITAMIN DAILY PO) Take by mouth      aspirin (ASPIRIN CHILDRENS) 81 MG chewable tablet Take 1 tablet by mouth daily 30 tablet 3     No current facility-administered medications for this visit. Review of Systems -     General ROS: negative  Psychological ROS: negative  Hematological and Lymphatic ROS: No history of blood clots or bleeding disorder. Respiratory ROS: no cough,  or wheezing, the rest see HPI  Cardiovascular ROS: See HPI  Gastrointestinal ROS: negative  Genito-Urinary ROS: no dysuria, trouble voiding, or hematuria  Musculoskeletal ROS: negative  Neurological ROS: no TIA or stroke symptoms  Dermatological ROS: negative      Blood pressure 132/85, pulse (!) 114, height 5' 9\" (1.753 m), weight 177 lb 12.8 oz (80.6 kg).         Physical Examination:    General appearance - alert, well appearing, and in no distress  HEENT- Pink conjunctiva  , Non-icteri sclera,PERRLA  Mental status - alert, oriented to person, place, and time  Neck - supple, no significant adenopathy, no JVD, or carotid bruits  Chest - clear to auscultation, no wheezes, rales or rhonchi, symmetric air entry  Heart - normal rate, regular rhythm, normal S1, S2, no murmurs, rubs, clicks or gallops  Abdomen - soft, nontender, nondistended, no masses or organomegaly  FRANC- no CVA or flank tenderness, no suprapubic tenderness  Neurological - alert, oriented, normal speech, no focal findings or movement disorder noted  Musculoskeletal/limbs - no joint tenderness, deformity or swelling   - peripheral pulses normal, no pedal edema, no clubbing or cyanosis  Skin - normal coloration and turgor, no rashes, no suspicious skin lesions noted  Psych- appropriate mood and affect    Lab  No results for input(s): CKTOTAL, CKMB, CKMBINDEX, TROPONINI in the last 72 hours.   CBC:   Lab Results   Component Value Date/Time    WBC 8.1 01/26/2023 07:15 AM    RBC 5.07 01/26/2023 07:15 AM    RBC 4.54 04/27/2012 01:20 PM    HGB 16.0 01/26/2023 07:15 AM    HCT 47.5 01/26/2023 07:15 AM    MCV 93.7 01/26/2023 07:15 AM    MCH 31.6 01/26/2023 07:15 AM    MCHC 33.7 01/26/2023 07:15 AM    RDW 13.4 01/22/2018 11:50 AM     01/26/2023 07:15 AM    MPV 8.5 01/26/2023 07:15 AM     BMP:    Lab Results   Component Value Date/Time     01/26/2023 07:15 AM    K 4.3 01/26/2023 07:15 AM    K 4.2 03/09/2021 10:59 AM     01/26/2023 07:15 AM    CO2 26 01/26/2023 07:15 AM    BUN 14 01/26/2023 07:15 AM    LABALBU 4.6 01/26/2023 07:15 AM    CREATININE 0.8 01/26/2023 07:15 AM    CALCIUM 9.4 01/26/2023 07:15 AM    LABGLOM >60 01/26/2023 07:15 AM    GLUCOSE 110 01/26/2023 07:15 AM     Hepatic Function Panel:    Lab Results   Component Value Date/Time    ALKPHOS 62 01/26/2023 07:15 AM    ALT 21 01/26/2023 07:15 AM    AST 11 01/26/2023 07:15 AM    PROT 7.3 01/26/2023 07:15 AM    BILITOT 0.3 01/26/2023 07:15 AM    BILIDIR <0.2 01/26/2023 07:15 AM    LABALBU 4.6 01/26/2023 07:15 AM     Magnesium:    Lab Results   Component Value Date/Time    MG 2.1 01/22/2018 11:50 AM     Warfarin PT/INR: No components found for: PTPATWAR, PTINRWAR  HgBA1c:    Lab Results   Component Value Date/Time    LABA1C 5.5 08/15/2022 08:51 AM     FLP:    Lab Results   Component Value Date/Time    TRIG 94 10/08/2015 03:19 AM    HDL 41 10/08/2015 03:19 AM    LDLCALC 85 10/08/2015 03:19 AM     TSH:    Lab Results   Component Value Date/Time    TSH 1.330 01/22/2018 11:50 AM       Ekg 1/26/23  Sinus rhythm with sinus arrhythmia with occasional Premature ventricular complexes Otherwise normal ECG When compared with ECG of 09-MAR-2021 10:48, Premature ventricular complexes are now Present    Troponin negative jan 26, 2023    Assessment     Diagnosis Orders   1. Chest pain, atypical  ECHO Complete 2D W Doppler W Color    NM MYOCARDIAL SPECT REST EXERCISE OR RX      2. Essential hypertension  ECHO Complete 2D W Doppler W Color    NM MYOCARDIAL SPECT REST EXERCISE OR RX      3. Tobacco use disorder  ECHO Complete 2D W Doppler W Color    NM MYOCARDIAL SPECT REST EXERCISE OR RX      4. Family history of early CAD  ECHO Complete 2D W Doppler W Color      5. Gastroesophageal reflux disease without esophagitis              Plan     Continue the current treatment and with constant vigilance to changes in symptoms and also any potential side effects. Return for care or seek medical attention immediately if symptoms got worse and/or develop new symptoms. Chest pain atypical  Risk factors  Started on asa and nexium in the ER  Echo  Ex nuc    Hypertension, on medical treatment. Seems to be under good control. Patient is compliant with medical treatment. D/w the pat the plan of care    Discussed use, benefit, and side effects of prescribed medications. All patient questions answered. Pt voiced understanding. Instructed to continue current medications, diet and exercise. Continue risk factor modification and medical management. Patient agreed with treatment plan. Follow up as directed.     RTC in 3 weeks        [facility]

## 2023-02-20 ENCOUNTER — HOSPITAL ENCOUNTER (OUTPATIENT)
Dept: NON INVASIVE DIAGNOSTICS | Age: 48
Discharge: HOME | End: 2023-02-20
Payer: MEDICAID

## 2023-02-20 VITALS — BODY MASS INDEX: 25.18 KG/M2 | WEIGHT: 170 LBS | HEIGHT: 69 IN

## 2023-02-20 DIAGNOSIS — R07.89 CHEST PAIN, ATYPICAL: ICD-10-CM

## 2023-02-20 DIAGNOSIS — Z82.49 FAMILY HISTORY OF EARLY CAD: ICD-10-CM

## 2023-02-20 DIAGNOSIS — I10 ESSENTIAL HYPERTENSION: ICD-10-CM

## 2023-02-20 DIAGNOSIS — F17.200 TOBACCO USE DISORDER: ICD-10-CM

## 2023-02-20 LAB
LV EF: 44 %
LV EF: 55 %
LVEF MODALITY: NORMAL
LVEF MODALITY: NORMAL

## 2023-02-20 PROCEDURE — 78452 HT MUSCLE IMAGE SPECT MULT: CPT | Performed by: INTERNAL MEDICINE

## 2023-02-20 PROCEDURE — 93017 CV STRESS TEST TRACING ONLY: CPT | Performed by: INTERNAL MEDICINE

## 2023-02-20 PROCEDURE — 3430000000 HC RX DIAGNOSTIC RADIOPHARMACEUTICAL: Performed by: INTERNAL MEDICINE

## 2023-02-20 PROCEDURE — A9500 TC99M SESTAMIBI: HCPCS | Performed by: INTERNAL MEDICINE

## 2023-02-20 PROCEDURE — 93306 TTE W/DOPPLER COMPLETE: CPT

## 2023-02-20 RX ORDER — TECHNETIUM TC-99M SESTAMIBI 1 MG/10ML
34 INJECTION INTRAVENOUS
Status: COMPLETED | OUTPATIENT
Start: 2023-02-20 | End: 2023-02-20

## 2023-02-20 RX ORDER — TECHNETIUM TC-99M SESTAMIBI 1 MG/10ML
10.8 INJECTION INTRAVENOUS
Status: COMPLETED | OUTPATIENT
Start: 2023-02-20 | End: 2023-02-20

## 2023-02-20 RX ADMIN — Medication 34 MILLICURIE: at 10:45

## 2023-02-20 RX ADMIN — Medication 10.8 MILLICURIE: at 09:25

## 2023-03-01 ENCOUNTER — OFFICE VISIT (OUTPATIENT)
Dept: CARDIOLOGY CLINIC | Age: 48
End: 2023-03-01
Payer: MEDICAID

## 2023-03-01 VITALS
HEIGHT: 69 IN | HEART RATE: 93 BPM | BODY MASS INDEX: 26.1 KG/M2 | WEIGHT: 176.2 LBS | DIASTOLIC BLOOD PRESSURE: 82 MMHG | SYSTOLIC BLOOD PRESSURE: 130 MMHG

## 2023-03-01 DIAGNOSIS — I10 ESSENTIAL HYPERTENSION: Primary | ICD-10-CM

## 2023-03-01 DIAGNOSIS — K21.9 GASTROESOPHAGEAL REFLUX DISEASE WITHOUT ESOPHAGITIS: ICD-10-CM

## 2023-03-01 DIAGNOSIS — Z82.49 FAMILY HISTORY OF EARLY CAD: ICD-10-CM

## 2023-03-01 DIAGNOSIS — Z87.898 HISTORY OF CHEST PAIN: ICD-10-CM

## 2023-03-01 PROCEDURE — 3079F DIAST BP 80-89 MM HG: CPT | Performed by: INTERNAL MEDICINE

## 2023-03-01 PROCEDURE — 99213 OFFICE O/P EST LOW 20 MIN: CPT | Performed by: INTERNAL MEDICINE

## 2023-03-01 PROCEDURE — G8427 DOCREV CUR MEDS BY ELIG CLIN: HCPCS | Performed by: INTERNAL MEDICINE

## 2023-03-01 PROCEDURE — 3075F SYST BP GE 130 - 139MM HG: CPT | Performed by: INTERNAL MEDICINE

## 2023-03-01 PROCEDURE — G8419 CALC BMI OUT NRM PARAM NOF/U: HCPCS | Performed by: INTERNAL MEDICINE

## 2023-03-01 PROCEDURE — 4004F PT TOBACCO SCREEN RCVD TLK: CPT | Performed by: INTERNAL MEDICINE

## 2023-03-01 PROCEDURE — G8484 FLU IMMUNIZE NO ADMIN: HCPCS | Performed by: INTERNAL MEDICINE

## 2023-03-01 NOTE — PROGRESS NOTES
Chief Complaint   Patient presents with    Follow-up     3 week      Last see  by cardiology nov 2019  Used to see dr. Venu Gutierrez for HTN      Patient here today for a 3 week follow up after a stress and echo    EKG done 1/27/2023    The chest pain got better  less frequent  and less severe and les prolonged now last only few second  Pat had hx of anxiety    Denied sob, palpitation, edema or dizziness    EKG done 1/26/2023    Smoke 1ppd for 27 yrs    FHx  Grandfather had MI at 54    Past Surgical History:   Procedure Laterality Date    EYE SURGERY      Glass eye placement, left eye. No Known Allergies     Family History   Problem Relation Age of Onset    Heart Disease Father     High Blood Pressure Father     High Blood Pressure Mother         Social History     Socioeconomic History    Marital status: Single     Spouse name: Not on file    Number of children: Not on file    Years of education: Not on file    Highest education level: Not on file   Occupational History    Not on file   Tobacco Use    Smoking status: Every Day     Packs/day: 0.50     Years: 22.00     Pack years: 11.00     Types: Cigarettes    Smokeless tobacco: Never   Vaping Use    Vaping Use: Never used   Substance and Sexual Activity    Alcohol use:  Yes     Alcohol/week: 24.0 standard drinks     Types: 24 Cans of beer per week     Comment: socially    Drug use: No    Sexual activity: Yes     Partners: Female   Other Topics Concern    Not on file   Social History Narrative    Not on file     Social Determinants of Health     Financial Resource Strain: Low Risk     Difficulty of Paying Living Expenses: Not hard at all   Food Insecurity: No Food Insecurity    Worried About Running Out of Food in the Last Year: Never true    Ran Out of Food in the Last Year: Never true   Transportation Needs: Not on file   Physical Activity: Not on file   Stress: Not on file   Social Connections: Not on file   Intimate Partner Violence: Not on file   Housing Stability: Not on file       Current Outpatient Medications   Medication Sig Dispense Refill    hydroCHLOROthiazide (HYDRODIURIL) 12.5 MG tablet take 1 tablet by mouth once daily 90 tablet 3    amLODIPine (NORVASC) 10 MG tablet take 1 tablet by mouth once daily 90 tablet 3    omeprazole (PRILOSEC) 10 MG delayed release capsule Take 10 mg by mouth daily      acetaminophen (TYLENOL) 500 MG tablet Take 1,000 mg by mouth every 6 hours as needed for Pain      Elastic Bandages & Supports (JOBST KNEE HIGH COMPRESSION SM) MISC 1 each by Does not apply route daily as needed (edema) 1 each 0    Multiple Vitamin (MULTI VITAMIN DAILY PO) Take by mouth      aspirin (ASPIRIN CHILDRENS) 81 MG chewable tablet Take 1 tablet by mouth daily 30 tablet 3     No current facility-administered medications for this visit. Review of Systems -     General ROS: negative  Psychological ROS: negative  Hematological and Lymphatic ROS: No history of blood clots or bleeding disorder. Respiratory ROS: no cough,  or wheezing, the rest see HPI  Cardiovascular ROS: See HPI  Gastrointestinal ROS: negative  Genito-Urinary ROS: no dysuria, trouble voiding, or hematuria  Musculoskeletal ROS: negative  Neurological ROS: no TIA or stroke symptoms  Dermatological ROS: negative      Blood pressure 130/82, pulse 93, height 5' 9\" (1.753 m), weight 176 lb 3.2 oz (79.9 kg).         Physical Examination:    General appearance - alert, well appearing, and in no distress  HEENT- Pink conjunctiva  , Non-icteri sclera,PERRLA  Mental status - alert, oriented to person, place, and time  Neck - supple, no significant adenopathy, no JVD, or carotid bruits  Chest - clear to auscultation, no wheezes, rales or rhonchi, symmetric air entry  Heart - normal rate, regular rhythm, normal S1, S2, no murmurs, rubs, clicks or gallops  Abdomen - soft, nontender, nondistended, no masses or organomegaly  FRANC- no CVA or flank tenderness, no suprapubic tenderness  Neurological - alert, oriented, normal speech, no focal findings or movement disorder noted  Musculoskeletal/limbs - no joint tenderness, deformity or swelling   - peripheral pulses normal, no pedal edema, no clubbing or cyanosis  Skin - normal coloration and turgor, no rashes, no suspicious skin lesions noted  Psych- appropriate mood and affect    Lab  No results for input(s): CKTOTAL, CKMB, CKMBINDEX, TROPONINI in the last 72 hours.  CBC:   Lab Results   Component Value Date/Time    WBC 8.1 01/26/2023 07:15 AM    RBC 5.07 01/26/2023 07:15 AM    RBC 4.54 04/27/2012 01:20 PM    HGB 16.0 01/26/2023 07:15 AM    HCT 47.5 01/26/2023 07:15 AM    MCV 93.7 01/26/2023 07:15 AM    MCH 31.6 01/26/2023 07:15 AM    MCHC 33.7 01/26/2023 07:15 AM    RDW 13.4 01/22/2018 11:50 AM     01/26/2023 07:15 AM    MPV 8.5 01/26/2023 07:15 AM     BMP:    Lab Results   Component Value Date/Time     01/26/2023 07:15 AM    K 4.3 01/26/2023 07:15 AM    K 4.2 03/09/2021 10:59 AM     01/26/2023 07:15 AM    CO2 26 01/26/2023 07:15 AM    BUN 14 01/26/2023 07:15 AM    LABALBU 4.6 01/26/2023 07:15 AM    CREATININE 0.8 01/26/2023 07:15 AM    CALCIUM 9.4 01/26/2023 07:15 AM    LABGLOM >60 01/26/2023 07:15 AM    GLUCOSE 110 01/26/2023 07:15 AM     Hepatic Function Panel:    Lab Results   Component Value Date/Time    ALKPHOS 62 01/26/2023 07:15 AM    ALT 21 01/26/2023 07:15 AM    AST 11 01/26/2023 07:15 AM    PROT 7.3 01/26/2023 07:15 AM    BILITOT 0.3 01/26/2023 07:15 AM    BILIDIR <0.2 01/26/2023 07:15 AM    LABALBU 4.6 01/26/2023 07:15 AM     Magnesium:    Lab Results   Component Value Date/Time    MG 2.1 01/22/2018 11:50 AM     Warfarin PT/INR:  No components found for: PTPATWAR, PTINRWAR  HgBA1c:    Lab Results   Component Value Date/Time    LABA1C 5.5 08/15/2022 08:51 AM     FLP:    Lab Results   Component Value Date/Time    TRIG 94 10/08/2015 03:19 AM    HDL 41 10/08/2015 03:19 AM    LDLCALC 85 10/08/2015 03:19 AM     TSH:    Lab Results  Component Value Date/Time    TSH 1Abhilash 01/22/2018 11:50 AM         Peak HR:152 bpm                      HR response: Appropriate    Peak BP:138/84 mmHg                  BP response: Normal Resting BP with    Predicted HR: 174 bpm                appropriate response to Stress    % of predicted HR: 87                HR/BP product:[id number]    Test duration:10 min                 Max exercise: 12.1 METS    Reason for termination:Physician    request                              Exercise effort:Good     Conclusions        Summary    Exercise EKG stress test is not suggestive for ischemia. The nuclear images is not suggestive for myocardial ischemia. Signatures        ----------------------------------------------------------------    Electronically signed by Sandee Marte MD (Interpreting    Cardiologist) on 02/20/2023 at 19:34    ----------------------------------------------------------------          Conclusions      Summary   Normal left ventricle size and systolic function. Ejection fraction was   estimated at 55 %. There were no regional left ventricular wall motion   abnormalities and wall thickness was within normal limits. Signature      ----------------------------------------------------------------   Electronically signed by Sandee Marte MD (Interpreting   physician) on 02/20/2023 at 06:57 PM   ----------------------------------------------------------------          Ekg 1/26/23  Sinus rhythm with sinus arrhythmia with occasional Premature ventricular complexes Otherwise normal ECG When compared with ECG of 09-MAR-2021 10:48, Premature ventricular complexes are now Present    Troponin negative jan 26, 2023    Assessment     Diagnosis Orders   1. Essential hypertension        2. History of chest pain atypical - better        3. Family history of early CAD        3.  Gastroesophageal reflux disease without esophagitis                Plan     Meds  and lab reviewed    Continue the current treatment and with constant vigilance to changes in symptoms and also any potential side effects. Return for care or seek medical attention immediately if symptoms got worse and/or develop new symptoms. Hx of Chest pain atypical better and appears noncardiac  Risk factors  Cont  on asa and nexium in the ER  Echo and Ex nuc- WNL    Hypertension, on medical treatment. Seems to be under good control. Patient is compliant with medical treatment. D/w the pat the plan of care    Roger Schmidt is stable and doing better    Discussed use, benefit, and side effects of prescribed medications. All patient questions answered. Pt voiced understanding. Instructed to continue current medications, diet and exercise. Continue risk factor modification and medical management. Patient agreed with treatment plan. Follow up as directed.     RTC in  4 months        Karena Benavides, [facility]

## 2023-03-02 ENCOUNTER — TELEMEDICINE (OUTPATIENT)
Dept: FAMILY MEDICINE CLINIC | Age: 48
End: 2023-03-02
Payer: MEDICAID

## 2023-03-02 DIAGNOSIS — R07.89 ATYPICAL CHEST PAIN: ICD-10-CM

## 2023-03-02 DIAGNOSIS — F41.1 GENERALIZED ANXIETY DISORDER: Primary | ICD-10-CM

## 2023-03-02 PROCEDURE — G8428 CUR MEDS NOT DOCUMENT: HCPCS | Performed by: NURSE PRACTITIONER

## 2023-03-02 PROCEDURE — 99214 OFFICE O/P EST MOD 30 MIN: CPT | Performed by: NURSE PRACTITIONER

## 2023-03-02 RX ORDER — ESCITALOPRAM OXALATE 10 MG/1
10 TABLET ORAL DAILY
Qty: 30 TABLET | Refills: 1 | Status: SHIPPED | OUTPATIENT
Start: 2023-03-02

## 2023-03-02 NOTE — PROGRESS NOTES
3/2/2023    TELEHEALTH EVALUATION -- Audio/Visual (During [facility] public health emergency)    HPI:    Breanna Rendon (:  1975) has requested an audio/video evaluation for the following concern(s): Anxiety  Has been having chest pains lately, following with cardiology and had stress test and echocardiogram. Raciel Wheeler has been having a lot of anxiety lately, cardiology thinking that the anxiety may be causing his chest pains, rec'd he f/u with PCP to discuss further. Triggers for anxiety are work, kids in school, relationship issues, general different things. He denies any panic attacks. Focus/concentration is fair. Sleep can be difficult, has difficulty falling asleep because he can't turn his mind off at night. Review of Systems    Prior to Visit Medications    Medication Sig Taking?  Authorizing Provider   escitalopram (LEXAPRO) 10 MG tablet Take 1 tablet by mouth daily Yes MARK Hernandez CNP   hydroCHLOROthiazide (HYDRODIURIL) 12.5 MG tablet take 1 tablet by mouth once daily  MARK Hernandez - CNP   amLODIPine (NORVASC) 10 MG tablet take 1 tablet by mouth once daily  MARK Hernandez - CNP   omeprazole (PRILOSEC) 10 MG delayed release capsule Take 10 mg by mouth daily  Historical Provider, MD   acetaminophen (TYLENOL) 500 MG tablet Take 1,000 mg by mouth every 6 hours as needed for Pain  Historical Provider, MD   Elastic Bandages & Supports (JOBST KNEE HIGH COMPRESSION SM) MISC 1 each by Does not apply route daily as needed (edema)  MARK Kebede CNP   Multiple Vitamin (MULTI VITAMIN DAILY PO) Take by mouth  Historical Provider, MD   aspirin (ASPIRIN CHILDRENS) 81 MG chewable tablet Take 1 tablet by mouth daily  Eusebio Harmon MD       Social History     Tobacco Use    Smoking status: Every Day     Packs/day: 0.50     Years: 22.00     Pack years: 11.00     Types: Cigarettes    Smokeless tobacco: Never   Vaping Use    Vaping Use: Never used   Substance Use Topics    Alcohol use: Yes     Alcohol/week: 24.0 standard drinks     Types: 24 Cans of beer per week     Comment: socially    Drug use: No        No Known Allergies,   Past Medical History:   Diagnosis Date    Blind     left eye    Essential hypertension 6/21/2017    Gastroesophageal reflux disease without esophagitis 1/29/2018    Gastroesophageal reflux disease without esophagitis 1/29/2018    Hypertension     10/7/2015    Tobacco use disorder 1/23/2018   ,   Past Surgical History:   Procedure Laterality Date    EYE SURGERY      Glass eye placement, left eye.    ,   Family History   Problem Relation Age of Onset    Heart Disease Father     High Blood Pressure Father     High Blood Pressure Mother    ,   Immunization History   Administered Date(s) Administered    Influenza Virus Vaccine 10/08/2015    Pneumococcal Polysaccharide (Rvtppoleh86) 10/08/2015   ,   Health Maintenance   Topic Date Due    COVID-19 Vaccine (1) Never done    HIV screen  Never done    Hepatitis C screen  Never done    DTaP/Tdap/Td vaccine (1 - Tdap) Never done    Lipids  10/08/2020    Colorectal Cancer Screen  Never done    Flu vaccine (1) 08/01/2022    Depression Screen  08/15/2023    Diabetes screen  08/15/2025    Pneumococcal 0-64 years Vaccine  Completed    Hepatitis A vaccine  Aged Out    Hib vaccine  Aged Out    Meningococcal (ACWY) vaccine  Aged Out     Stress test 2/20/23  ECG Findings    No ischemic EKG changes. Arrhythmias    No stress induced arrhythmia. Symptoms    Patient did not have chest pain during the stress. Stress Interpretation    Exercise capacity: Normal capacity . No chest pain during the stress. No stress induced arrhythmia. Exercise EKG stress test is not suggestive for ischemia. Echocardiogram 2/20/23  Conclusions      Summary   Normal left ventricle size and systolic function. Ejection fraction was   estimated at 55 %.  There were no regional left ventricular wall motion   abnormalities and wall thickness was within normal limits. PHYSICAL EXAMINATION:  [ INSTRUCTIONS:  \"[x]\" Indicates a positive item  \"[]\" Indicates a negative item  -- DELETE ALL ITEMS NOT EXAMINED]  Vital Signs: (As obtained by patient/caregiver or practitioner observation)    Blood pressure-  Heart rate-    Respiratory rate-    Temperature-  Pulse oximetry-     Constitutional: [x] Appears well-developed and well-nourished [x] No apparent distress      [] Abnormal-   Mental status  [x] Alert and awake  [x] Oriented to person/place/time [x]Able to follow commands      Eyes:  EOM    [x]  Normal  [] Abnormal-  Sclera  [x]  Normal  [] Abnormal -         Discharge [x]  None visible  [] Abnormal -    HENT:   [x] Normocephalic, atraumatic. [] Abnormal   [x] Mouth/Throat: Mucous membranes are moist.     External Ears [x] Normal  [] Abnormal-     Neck: [x] No visualized mass     Pulmonary/Chest: [x] Respiratory effort normal.  [x] No visualized signs of difficulty breathing or respiratory distress        [] Abnormal-      Musculoskeletal:   [x] Normal gait with no signs of ataxia         [x] Normal range of motion of neck        [] Abnormal-       Neurological:        [x] No Facial Asymmetry (Cranial nerve 7 motor function) (limited exam to video visit)          [x] No gaze palsy        [] Abnormal-         Skin:        [x] No significant exanthematous lesions or discoloration noted on facial skin         [] Abnormal-            Psychiatric:       [x] Normal Affect [x] No Hallucinations        [] Abnormal-     Other pertinent observable physical exam findings-     ASSESSMENT & PLAN  Joey Moy was seen today for anxiety. Diagnoses and all orders for this visit:    Generalized anxiety disorder  -     escitalopram (LEXAPRO) 10 MG tablet;  Take 1 tablet by mouth daily    Atypical chest pain    - reviewed cardiac workup, suspect CP symptoms likely secondary to anxiety  - start Lexapro, discussed benefits, risks and side effects    Return in about 6 weeks (around 4/13/2023) for ROSSY. Stacie Baker is a 56 y.o. male being evaluated by a Virtual Visit (video visit) encounter to address concerns as mentioned above. A caregiver was present when appropriate. Due to this being a TeleHealth encounter (During [address] emergency), evaluation of the following organ systems was limited: Vitals/Constitutional/EENT/Resp/CV/GI//MS/Neuro/Skin/Heme-Lymph-Imm. Pursuant to the emergency declaration under the [address] [place] [address], [address] [place] [address] authority and the [email or web address] and [employer] Act, this Virtual Visit was conducted with patient's (and/or legal guardian's) consent, to reduce the patient's risk of exposure to COVID-19 and provide necessary medical care. The patient (and/or legal guardian) has also been advised to contact this office for worsening conditions or problems, and seek emergency medical treatment and/or call 1 if deemed necessary. Services were provided through a video synchronous discussion virtually to substitute for in-person clinic visit. Patient and provider were located at their individual homes. --MARK Lewis CNP on 3/2/2023 at 12:01 PM    An electronic signature was used to authenticate this note.

## 2023-04-20 DIAGNOSIS — F41.1 GENERALIZED ANXIETY DISORDER: ICD-10-CM

## 2023-04-20 RX ORDER — ESCITALOPRAM OXALATE 10 MG/1
TABLET ORAL
Qty: 30 TABLET | Refills: 1 | OUTPATIENT
Start: 2023-04-20

## 2023-04-26 ENCOUNTER — OFFICE VISIT (OUTPATIENT)
Dept: FAMILY MEDICINE CLINIC | Age: 48
End: 2023-04-26
Payer: MEDICAID

## 2023-04-26 VITALS
BODY MASS INDEX: 28.44 KG/M2 | SYSTOLIC BLOOD PRESSURE: 126 MMHG | WEIGHT: 192 LBS | OXYGEN SATURATION: 98 % | HEIGHT: 69 IN | DIASTOLIC BLOOD PRESSURE: 88 MMHG | HEART RATE: 76 BPM | TEMPERATURE: 97.6 F | RESPIRATION RATE: 16 BRPM

## 2023-04-26 DIAGNOSIS — R73.09 ELEVATED GLUCOSE: Primary | ICD-10-CM

## 2023-04-26 DIAGNOSIS — F41.1 GENERALIZED ANXIETY DISORDER: ICD-10-CM

## 2023-04-26 DIAGNOSIS — I10 ESSENTIAL HYPERTENSION: ICD-10-CM

## 2023-04-26 PROCEDURE — 3074F SYST BP LT 130 MM HG: CPT | Performed by: NURSE PRACTITIONER

## 2023-04-26 PROCEDURE — G8427 DOCREV CUR MEDS BY ELIG CLIN: HCPCS | Performed by: NURSE PRACTITIONER

## 2023-04-26 PROCEDURE — 3079F DIAST BP 80-89 MM HG: CPT | Performed by: NURSE PRACTITIONER

## 2023-04-26 PROCEDURE — 4004F PT TOBACCO SCREEN RCVD TLK: CPT | Performed by: NURSE PRACTITIONER

## 2023-04-26 PROCEDURE — G8419 CALC BMI OUT NRM PARAM NOF/U: HCPCS | Performed by: NURSE PRACTITIONER

## 2023-04-26 PROCEDURE — 99214 OFFICE O/P EST MOD 30 MIN: CPT | Performed by: NURSE PRACTITIONER

## 2023-04-26 RX ORDER — ESCITALOPRAM OXALATE 10 MG/1
10 TABLET ORAL DAILY
Qty: 90 TABLET | Refills: 1 | Status: SHIPPED | OUTPATIENT
Start: 2023-04-26

## 2023-04-26 ASSESSMENT — PATIENT HEALTH QUESTIONNAIRE - PHQ9
SUM OF ALL RESPONSES TO PHQ QUESTIONS 1-9: 0
SUM OF ALL RESPONSES TO PHQ9 QUESTIONS 1 & 2: 0
DEPRESSION UNABLE TO ASSESS: FUNCTIONAL CAPACITY MOTIVATION LIMITS ACCURACY
SUM OF ALL RESPONSES TO PHQ QUESTIONS 1-9: 0
2. FEELING DOWN, DEPRESSED OR HOPELESS: 0
SUM OF ALL RESPONSES TO PHQ QUESTIONS 1-9: 0
1. LITTLE INTEREST OR PLEASURE IN DOING THINGS: 0
SUM OF ALL RESPONSES TO PHQ QUESTIONS 1-9: 0

## 2023-04-26 NOTE — PROGRESS NOTES
Chief Complaint   Patient presents with    Follow-up     Depression and anxiety doing well        History obtained from chart review and the patient. SUBJECTIVE:  Arnaldo Bourgeois is a 56 y.o. male that presents today for follow up anxiety/depression    Taking Lexapro 10 mg and tolerating it well. Denies any side effects. It does help him sleep. He doesn't feel like his mind is racing any more. Anxiety is doing well, is manageable. He denies any depression. Denies any SI/HI.     Age/Gender Health Maintenance    Lipid -   Lab Results   Component Value Date    CHOL 145 10/08/2015     Lab Results   Component Value Date    TRIG 94 10/08/2015     Lab Results   Component Value Date    HDL 41 10/08/2015     Lab Results   Component Value Date    LDLCALC 85 10/08/2015     DM Screen -   Lab Results   Component Value Date    LABA1C 5.5 08/15/2022     No results found for: EAG  Colon Cancer Screening - 39  Lung Cancer Screening (Age 54 to [de-identified] with 30 pack year hx, current smoker or quit within past 15 years) -48    Tetanus - needs  Influenza Vaccine - yearly  Pneumonia Vaccine - 65  Zostavax - 50  HPV Vaccine - n/a    PSA testing discussion - 54  AAA Screening -65    Falls screening - n/a    Current Outpatient Medications   Medication Sig Dispense Refill    escitalopram (LEXAPRO) 10 MG tablet Take 1 tablet by mouth daily 90 tablet 1    hydroCHLOROthiazide (HYDRODIURIL) 12.5 MG tablet take 1 tablet by mouth once daily 90 tablet 3    amLODIPine (NORVASC) 10 MG tablet take 1 tablet by mouth once daily 90 tablet 3    omeprazole (PRILOSEC) 10 MG delayed release capsule Take 1 capsule by mouth daily      acetaminophen (TYLENOL) 500 MG tablet Take 2 tablets by mouth every 6 hours as needed for Pain      Elastic Bandages & Supports (JOBST KNEE HIGH COMPRESSION SM) MISC 1 each by Does not apply route daily as needed (edema) 1 each 0    Multiple Vitamin (MULTI VITAMIN DAILY PO) Take by mouth      aspirin (ASPIRIN CHILDRENS) 81 MG

## 2023-05-03 ENCOUNTER — TELEPHONE (OUTPATIENT)
Dept: FAMILY MEDICINE CLINIC | Age: 48
End: 2023-05-03

## 2023-05-03 NOTE — TELEPHONE ENCOUNTER
----- Message from Wyatt Rodgers sent at 5/3/2023 10:34 AM EDT -----  Subject: Message to Provider    QUESTIONS  Information for Provider? Patient mm called for him about if he needs to   fast for blood work ? He wants to go on Thursday to get done. call   [email or web address]   ---------------------------------------------------------------------------  --------------  Garrett MATHEWS  [phone]; OK to leave message on voicemail  ---------------------------------------------------------------------------  --------------  SCRIPT ANSWERS  Relationship to Patient? Parent  Representative Name? mom  Patient is under 25 and the Parent has custody? No  Is the representative on the Communication Release of Information (MICHAEL)   form in Epic?  Yes

## 2023-05-07 DIAGNOSIS — F41.1 GENERALIZED ANXIETY DISORDER: ICD-10-CM

## 2023-05-08 RX ORDER — ESCITALOPRAM OXALATE 10 MG/1
TABLET ORAL
Qty: 30 TABLET | OUTPATIENT
Start: 2023-05-08

## 2023-07-03 ENCOUNTER — OFFICE VISIT (OUTPATIENT)
Dept: CARDIOLOGY CLINIC | Age: 48
End: 2023-07-03
Payer: MEDICAID

## 2023-07-03 VITALS
HEART RATE: 85 BPM | SYSTOLIC BLOOD PRESSURE: 141 MMHG | HEIGHT: 69 IN | WEIGHT: 196 LBS | DIASTOLIC BLOOD PRESSURE: 92 MMHG | BODY MASS INDEX: 29.03 KG/M2

## 2023-07-03 DIAGNOSIS — F17.200 TOBACCO USE DISORDER: ICD-10-CM

## 2023-07-03 DIAGNOSIS — I10 ESSENTIAL HYPERTENSION: Primary | ICD-10-CM

## 2023-07-03 DIAGNOSIS — Z87.898 HISTORY OF CHEST PAIN: ICD-10-CM

## 2023-07-03 DIAGNOSIS — K21.9 GASTROESOPHAGEAL REFLUX DISEASE WITHOUT ESOPHAGITIS: ICD-10-CM

## 2023-07-03 PROCEDURE — 99213 OFFICE O/P EST LOW 20 MIN: CPT | Performed by: INTERNAL MEDICINE

## 2023-07-03 PROCEDURE — 4004F PT TOBACCO SCREEN RCVD TLK: CPT | Performed by: INTERNAL MEDICINE

## 2023-07-03 PROCEDURE — G8419 CALC BMI OUT NRM PARAM NOF/U: HCPCS | Performed by: INTERNAL MEDICINE

## 2023-07-03 PROCEDURE — 3077F SYST BP >= 140 MM HG: CPT | Performed by: INTERNAL MEDICINE

## 2023-07-03 PROCEDURE — G8427 DOCREV CUR MEDS BY ELIG CLIN: HCPCS | Performed by: INTERNAL MEDICINE

## 2023-07-03 PROCEDURE — 3080F DIAST BP >= 90 MM HG: CPT | Performed by: INTERNAL MEDICINE

## 2023-07-03 NOTE — PROGRESS NOTES
(LEXAPRO) 10 MG tablet Take 1 tablet by mouth daily 90 tablet 1    hydroCHLOROthiazide (HYDRODIURIL) 12.5 MG tablet take 1 tablet by mouth once daily 90 tablet 3    amLODIPine (NORVASC) 10 MG tablet take 1 tablet by mouth once daily 90 tablet 3    omeprazole (PRILOSEC) 10 MG delayed release capsule Take 1 capsule by mouth daily      acetaminophen (TYLENOL) 500 MG tablet Take 2 tablets by mouth every 6 hours as needed for Pain      Elastic Bandages & Supports (JOBST KNEE HIGH COMPRESSION SM) MISC 1 each by Does not apply route daily as needed (edema) 1 each 0    Multiple Vitamin (MULTI VITAMIN DAILY PO) Take by mouth      aspirin (ASPIRIN CHILDRENS) 81 MG chewable tablet Take 1 tablet by mouth daily 30 tablet 3     No current facility-administered medications for this visit. Review of Systems -     General ROS: negative  Psychological ROS: negative  Hematological and Lymphatic ROS: No history of blood clots or bleeding disorder. Respiratory ROS: no cough,  or wheezing, the rest see HPI  Cardiovascular ROS: See HPI  Gastrointestinal ROS: negative  Genito-Urinary ROS: no dysuria, trouble voiding, or hematuria  Musculoskeletal ROS: negative  Neurological ROS: no TIA or stroke symptoms  Dermatological ROS: negative      Blood pressure (!) 141/92, pulse 85, height 5' 9\" (1.753 m), weight 196 lb (88.9 kg).         Physical Examination:    General appearance - alert, well appearing, and in no distress  HEENT- Pink conjunctiva  , Non-icteri sclera,PERRLA  Mental status - alert, oriented to person, place, and time  Neck - supple, no significant adenopathy, no JVD, or carotid bruits  Chest - clear to auscultation, no wheezes, rales or rhonchi, symmetric air entry  Heart - normal rate, regular rhythm, normal S1, S2, no murmurs, rubs, clicks or gallops  Abdomen - soft, nontender, nondistended, no masses or organomegaly  FRANC- no CVA or flank tenderness, no suprapubic tenderness  Neurological - alert, oriented, normal

## 2023-07-12 ENCOUNTER — TELEPHONE (OUTPATIENT)
Dept: FAMILY MEDICINE CLINIC | Age: 48
End: 2023-07-12

## 2023-07-12 NOTE — TELEPHONE ENCOUNTER
----- Message from MARK Cuellar CNP sent at 7/12/2023 10:56 AM EDT -----  Let Karolina Mcclain know his cologuard test was negative. Rec'd repeating in 3 years!

## 2023-11-09 DIAGNOSIS — I10 ESSENTIAL HYPERTENSION: ICD-10-CM

## 2023-11-09 RX ORDER — AMLODIPINE BESYLATE 10 MG/1
TABLET ORAL
Qty: 90 TABLET | Refills: 3 | Status: SHIPPED | OUTPATIENT
Start: 2023-11-09

## 2023-11-09 NOTE — TELEPHONE ENCOUNTER
Future Appointments   Date Time Provider [address]   1/8/2024  9:45 AM Judy Barber MD N [facility]    Recent Visits  Date Type Provider Dept   04/26/23 Office Visit Tonna Homans, APRN - CNP [facility]   08/15/22 Office Visit Tonna Homans, APRN - CNP [facility]   Showing recent visits within past 540 days with a meds authorizing provider and meeting all other requirements  Future Appointments  No visits were found meeting these conditions.   Showing future appointments within next 150 days with a meds authorizing provider and meeting all other requirements

## 2023-11-19 DIAGNOSIS — I10 ESSENTIAL HYPERTENSION: ICD-10-CM

## 2023-11-20 RX ORDER — AMLODIPINE BESYLATE 10 MG/1
TABLET ORAL
Qty: 90 TABLET | Refills: 3 | OUTPATIENT
Start: 2023-11-20

## 2024-01-02 ENCOUNTER — HOSPITAL ENCOUNTER (EMERGENCY)
Age: 49
Discharge: HOME | End: 2024-01-02

## 2024-01-02 VITALS
BODY MASS INDEX: 29.53 KG/M2 | TEMPERATURE: 98.1 F | SYSTOLIC BLOOD PRESSURE: 139 MMHG | DIASTOLIC BLOOD PRESSURE: 98 MMHG | WEIGHT: 200 LBS | HEART RATE: 99 BPM | RESPIRATION RATE: 20 BRPM | OXYGEN SATURATION: 96 %

## 2024-01-02 DIAGNOSIS — R51.9 NONINTRACTABLE HEADACHE, UNSPECIFIED CHRONICITY PATTERN, UNSPECIFIED HEADACHE TYPE: Primary | ICD-10-CM

## 2024-01-02 DIAGNOSIS — I10 ESSENTIAL HYPERTENSION: ICD-10-CM

## 2024-01-02 PROCEDURE — 6360000002 HC RX W HCPCS: Performed by: NURSE PRACTITIONER

## 2024-01-02 PROCEDURE — 96372 THER/PROPH/DIAG INJ SC/IM: CPT

## 2024-01-02 PROCEDURE — 99213 OFFICE O/P EST LOW 20 MIN: CPT | Performed by: NURSE PRACTITIONER

## 2024-01-02 PROCEDURE — 99213 OFFICE O/P EST LOW 20 MIN: CPT

## 2024-01-02 RX ORDER — KETOROLAC TROMETHAMINE 10 MG/1
10 TABLET, FILM COATED ORAL EVERY 6 HOURS PRN
Qty: 20 TABLET | Refills: 0 | Status: SHIPPED | OUTPATIENT
Start: 2024-01-02

## 2024-01-02 RX ORDER — KETOROLAC TROMETHAMINE 30 MG/ML
60 INJECTION, SOLUTION INTRAMUSCULAR; INTRAVENOUS ONCE
Status: COMPLETED | OUTPATIENT
Start: 2024-01-02 | End: 2024-01-02

## 2024-01-02 RX ADMIN — KETOROLAC TROMETHAMINE 60 MG: 60 INJECTION, SOLUTION INTRAMUSCULAR at 09:50

## 2024-01-02 ASSESSMENT — PAIN DESCRIPTION - DESCRIPTORS
DESCRIPTORS: SHARP;ACHING
DESCRIPTORS: ACHING

## 2024-01-02 ASSESSMENT — PAIN SCALES - GENERAL
PAINLEVEL_OUTOF10: 1
PAINLEVEL_OUTOF10: 7

## 2024-01-02 ASSESSMENT — ENCOUNTER SYMPTOMS
DIARRHEA: 0
SORE THROAT: 0
NAUSEA: 0
VOMITING: 0
RHINORRHEA: 0
SHORTNESS OF BREATH: 0
CHEST TIGHTNESS: 0
COUGH: 0

## 2024-01-02 ASSESSMENT — PAIN - FUNCTIONAL ASSESSMENT
PAIN_FUNCTIONAL_ASSESSMENT: 0-10
PAIN_FUNCTIONAL_ASSESSMENT: 0-10

## 2024-01-02 ASSESSMENT — PAIN DESCRIPTION - PAIN TYPE: TYPE: ACUTE PAIN

## 2024-01-02 ASSESSMENT — PAIN DESCRIPTION - LOCATION
LOCATION: HEAD
LOCATION: HEAD

## 2024-01-02 ASSESSMENT — PAIN DESCRIPTION - FREQUENCY: FREQUENCY: CONTINUOUS

## 2024-01-02 NOTE — ED PROVIDER NOTES
[facility]  Urgent Care Encounter       CHIEF COMPLAINT       Chief Complaint   Patient presents with    Headache       Nurses Notes reviewed and I agree except as noted in the HPI.  HISTORY OF PRESENT ILLNESS   Claudy Barnard is a 48 y.o. male who presents to the [facility] urgent care for evaluation of headache.  He reports the headache started roughly 3 days ago.  He reports he does have a history of hypertension.  He has noted to be hypertensive here at urgent care with a blood pressure of 136/102.  He does take Norvasc and HCTZ.  He reports he has been taking his medications as appropriate.  He reports the pain as in the right temple behind his right eye.  He does report a history of blindness in his left eye.  He does report mild dizziness with position changes.  Denies visual changes in his right eye.  Denies chest pain, dyspnea, nausea, or diaphoresis.    The history is provided by the patient. No  was used.       REVIEW OF SYSTEMS     Review of Systems   Constitutional:  Negative for activity change, appetite change, chills, fatigue and fever.   HENT:  Negative for ear discharge, ear pain, rhinorrhea and sore throat.    Respiratory:  Negative for cough, chest tightness and shortness of breath.    Cardiovascular:  Negative for chest pain.   Gastrointestinal:  Negative for diarrhea, nausea and vomiting.   Genitourinary:  Negative for dysuria.   Skin:  Negative for rash.   Allergic/Immunologic: Negative for environmental allergies and food allergies.   Neurological:  Positive for dizziness and headaches.       PAST MEDICAL HISTORY         Diagnosis Date    Blind     left eye    Essential hypertension 6/21/2017    Gastroesophageal reflux disease without esophagitis 1/29/2018    Gastroesophageal reflux disease without esophagitis 1/29/2018    Hypertension     10/7/2015    Tobacco use disorder 1/23/2018       SURGICALHISTORY     Patient  has a past surgical history that  evaluated for headache.  He has noted to be mildly hypertensive.  I did initially recommend transferring the patient to [facility] emergency department for further evaluation as he is blind in the left eye and that the pain is behind his right eye.  He declines transfer at this time.  He was medicated with an injection of Toradol, which improved his headache and hypertension.  He is provided a prescription for Toradol.  Instructed to push oral fluids.  Instructed have close follow-up with his PCP for reevaluation of his hypertension.  Instructed present to the emergency department for worsening headache, worsening hypertension, or other symptoms deemed emergent.  Patient is agreeable to plan and denies questions or concerns at this time.      PATIENT REFERRED TO:  Burak Gaitan, MARK - CNP  [address] / [place] [address]      DISCHARGE MEDICATIONS:  Discharge Medication List as of 1/2/2024 10:25 AM        START taking these medications    Details   ketorolac (TORADOL) 10 MG tablet Take 1 tablet by mouth every 6 hours as needed for Pain, Disp-20 tablet, R-0Normal             Discharge Medication List as of 1/2/2024 10:25 AM          Discharge Medication List as of 1/2/2024 10:25 AM          MARK Clark CNP    (Please note that portions of this note were completed with a voice recognition program. Efforts were made to edit the dictations but occasionally words are mis-transcribed.)           Jc Whitt, APRN - CNP  01/02/24 [id number]

## 2024-01-02 NOTE — ED TRIAGE NOTES
Hpi Title: Evaluation of Skin Lesions Patient to room with c/o headache beginning three days ago. Elevated blood pressure noted. Denies chest pain or shortness of breath. Speech clear, appropriate.

## 2024-01-04 ENCOUNTER — HOSPITAL ENCOUNTER (EMERGENCY)
Age: 49
Discharge: HOME | End: 2024-01-04

## 2024-01-04 ENCOUNTER — APPOINTMENT (OUTPATIENT)
Dept: CT IMAGING | Age: 49
End: 2024-01-04

## 2024-01-04 VITALS
RESPIRATION RATE: 16 BRPM | HEART RATE: 72 BPM | BODY MASS INDEX: 29.62 KG/M2 | TEMPERATURE: 98.1 F | HEIGHT: 69 IN | OXYGEN SATURATION: 97 % | WEIGHT: 200 LBS | DIASTOLIC BLOOD PRESSURE: 88 MMHG | SYSTOLIC BLOOD PRESSURE: 117 MMHG

## 2024-01-04 DIAGNOSIS — G44.209 TENSION HEADACHE: Primary | ICD-10-CM

## 2024-01-04 DIAGNOSIS — J01.10 ACUTE NON-RECURRENT FRONTAL SINUSITIS: ICD-10-CM

## 2024-01-04 LAB
ALBUMIN SERPL BCG-MCNC: 4.3 G/DL (ref 3.5–5.1)
ALP SERPL-CCNC: 64 U/L (ref 38–126)
ALT SERPL W/O P-5'-P-CCNC: 27 U/L (ref 11–66)
ANION GAP SERPL CALC-SCNC: 12 MEQ/L (ref 8–16)
AST SERPL-CCNC: 15 U/L (ref 5–40)
BASOPHILS ABSOLUTE: 0 THOU/MM3 (ref 0–0.1)
BASOPHILS NFR BLD AUTO: 0.7 %
BILIRUB SERPL-MCNC: 0.5 MG/DL (ref 0.3–1.2)
BUN SERPL-MCNC: 13 MG/DL (ref 7–22)
CALCIUM SERPL-MCNC: 9.5 MG/DL (ref 8.5–10.5)
CHLORIDE SERPL-SCNC: 104 MEQ/L (ref 98–111)
CO2 SERPL-SCNC: 22 MEQ/L (ref 23–33)
CREAT SERPL-MCNC: 0.7 MG/DL (ref 0.4–1.2)
CRP SERPL-MCNC: < 0.3 MG/DL (ref 0–1)
DEPRECATED RDW RBC AUTO: 43.9 FL (ref 35–45)
EOSINOPHIL NFR BLD AUTO: 0.9 %
EOSINOPHILS ABSOLUTE: 0.1 THOU/MM3 (ref 0–0.4)
ERYTHROCYTE [DISTWIDTH] IN BLOOD BY AUTOMATED COUNT: 12.6 % (ref 11.5–14.5)
GFR SERPL CREATININE-BSD FRML MDRD: > 60 ML/MIN/1.73M2
GLUCOSE SERPL-MCNC: 103 MG/DL (ref 70–108)
HCT VFR BLD AUTO: 45.4 % (ref 42–52)
HGB BLD-MCNC: 15.7 GM/DL (ref 14–18)
IMM GRANULOCYTES # BLD AUTO: 0.03 THOU/MM3 (ref 0–0.07)
IMM GRANULOCYTES NFR BLD AUTO: 0.4 %
LYMPHOCYTES ABSOLUTE: 1.6 THOU/MM3 (ref 1–4.8)
LYMPHOCYTES NFR BLD AUTO: 23.4 %
MCH RBC QN AUTO: 32.8 PG (ref 26–33)
MCHC RBC AUTO-ENTMCNC: 34.6 GM/DL (ref 32.2–35.5)
MCV RBC AUTO: 94.8 FL (ref 80–94)
MONOCYTES ABSOLUTE: 0.3 THOU/MM3 (ref 0.4–1.3)
MONOCYTES NFR BLD AUTO: 4.9 %
NEUTROPHILS NFR BLD AUTO: 69.7 %
NRBC BLD AUTO-RTO: 0 /100 WBC
OSMOLALITY SERPL CALC.SUM OF ELEC: 276 MOSMOL/KG (ref 275–300)
PLATELET # BLD AUTO: 335 THOU/MM3 (ref 130–400)
PMV BLD AUTO: 8.6 FL (ref 9.4–12.4)
POTASSIUM SERPL-SCNC: 4 MEQ/L (ref 3.5–5.2)
PROT SERPL-MCNC: 7 G/DL (ref 6.1–8)
RBC # BLD AUTO: 4.79 MILL/MM3 (ref 4.7–6.1)
SEGMENTED NEUTROPHILS ABSOLUTE COUNT: 4.7 THOU/MM3 (ref 1.8–7.7)
SODIUM SERPL-SCNC: 138 MEQ/L (ref 135–145)
WBC # BLD AUTO: 6.7 THOU/MM3 (ref 4.8–10.8)

## 2024-01-04 PROCEDURE — 96375 TX/PRO/DX INJ NEW DRUG ADDON: CPT

## 2024-01-04 PROCEDURE — 36415 COLL VENOUS BLD VENIPUNCTURE: CPT

## 2024-01-04 PROCEDURE — 80053 COMPREHEN METABOLIC PANEL: CPT

## 2024-01-04 PROCEDURE — 86140 C-REACTIVE PROTEIN: CPT

## 2024-01-04 PROCEDURE — 6360000002 HC RX W HCPCS: Performed by: NURSE PRACTITIONER

## 2024-01-04 PROCEDURE — 85025 COMPLETE CBC W/AUTO DIFF WBC: CPT

## 2024-01-04 PROCEDURE — 99284 EMERGENCY DEPT VISIT MOD MDM: CPT

## 2024-01-04 PROCEDURE — 96374 THER/PROPH/DIAG INJ IV PUSH: CPT

## 2024-01-04 PROCEDURE — 70450 CT HEAD/BRAIN W/O DYE: CPT

## 2024-01-04 RX ORDER — DIPHENHYDRAMINE HYDROCHLORIDE 50 MG/ML
25 INJECTION INTRAMUSCULAR; INTRAVENOUS ONCE
Status: COMPLETED | OUTPATIENT
Start: 2024-01-04 | End: 2024-01-04

## 2024-01-04 RX ORDER — OXYMETAZOLINE HYDROCHLORIDE 0.05 G/100ML
2 SPRAY NASAL 2 TIMES DAILY
Qty: 15 ML | Refills: 0 | Status: SHIPPED | OUTPATIENT
Start: 2024-01-04 | End: 2024-01-07

## 2024-01-04 RX ORDER — PROCHLORPERAZINE EDISYLATE 5 MG/ML
10 INJECTION INTRAMUSCULAR; INTRAVENOUS ONCE
Status: COMPLETED | OUTPATIENT
Start: 2024-01-04 | End: 2024-01-04

## 2024-01-04 RX ORDER — KETOROLAC TROMETHAMINE 30 MG/ML
30 INJECTION, SOLUTION INTRAMUSCULAR; INTRAVENOUS ONCE
Status: COMPLETED | OUTPATIENT
Start: 2024-01-04 | End: 2024-01-04

## 2024-01-04 RX ORDER — BUTALBITAL, ACETAMINOPHEN AND CAFFEINE 300; 40; 50 MG/1; MG/1; MG/1
1 CAPSULE ORAL EVERY 6 HOURS PRN
Qty: 20 CAPSULE | Refills: 0 | Status: SHIPPED | OUTPATIENT
Start: 2024-01-04

## 2024-01-04 RX ADMIN — PROCHLORPERAZINE EDISYLATE 10 MG: 5 INJECTION INTRAMUSCULAR; INTRAVENOUS at 10:22

## 2024-01-04 RX ADMIN — KETOROLAC TROMETHAMINE 30 MG: 30 INJECTION INTRAMUSCULAR; INTRAVENOUS at 10:22

## 2024-01-04 RX ADMIN — DIPHENHYDRAMINE HYDROCHLORIDE 25 MG: 50 INJECTION INTRAMUSCULAR; INTRAVENOUS at 10:22

## 2024-01-04 ASSESSMENT — PAIN DESCRIPTION - LOCATION: LOCATION: HEAD

## 2024-01-04 ASSESSMENT — PAIN - FUNCTIONAL ASSESSMENT
PAIN_FUNCTIONAL_ASSESSMENT: 0-10
PAIN_FUNCTIONAL_ASSESSMENT: NONE - DENIES PAIN

## 2024-01-04 ASSESSMENT — VISUAL ACUITY
OD: 20/20
OU: 20/20

## 2024-01-04 ASSESSMENT — PAIN SCALES - GENERAL: PAINLEVEL_OUTOF10: 3

## 2024-01-04 NOTE — ED NOTES
Pt presents to the ED with complaints of PETERSON. PT was seen at urgent care yesterday and was given IM Toradol shot and sent home with oral Toradol. Pt states he did not take Toradol this morning but he took his ASA and home BP medication. Pt states HA is over R eye. Pt respirations are even and unlabored.

## 2024-01-04 NOTE — ED PROVIDER NOTES
[facility] Emergency Department    CHIEF COMPLAINT       Chief Complaint   Patient presents with    Headache       Nurses Notes reviewed and I agree except as noted in the HPI.    HISTORY OF PRESENT ILLNESS   Claudy Barnard is a 48 y.o. male who presents to the ED for evaluation of headache x 1 week.  Patient was seen in urgent care on 1/2/2024 and he states his BP on arrival was 154/110. He was given Toradol IM and discharged with PO Toradol, which did improve his headache. He did not take Toradol today. The headache is mainly over his right eye and does not radiate. He describes the pain as being punched in the eye.  Patient is having blurry vision in the mornings, photophobia, and dizziness when sitting up quickly. Denies fever, chills, nausea, vomiting, numbness, tingling, or weakness. Patient has never had a headache like this before. He checks his BP every morning and usually is around 120/80 but recently his BP has been elevated. He takes amlodipine and HCTZ every morning for hypertension and has been compliant.  Patient does smoke 1 pack/day and is trying to quit.  He drinks a 12 pack of beer per week.  He has left eye blindness since 9 years old.    HPI was provided by the patient.         PAST MEDICAL HISTORY     Past Medical History:   Diagnosis Date    Blind     left eye    Essential hypertension 6/21/2017    Gastroesophageal reflux disease without esophagitis 1/29/2018    Gastroesophageal reflux disease without esophagitis 1/29/2018    Hypertension     10/7/2015    Tobacco use disorder 1/23/2018       SURGICALHISTORY      has a past surgical history that includes eye surgery.    CURRENT MEDICATIONS       Discharge Medication List as of 1/4/2024 12:33 PM        CONTINUE these medications which have NOT CHANGED    Details   ketorolac (TORADOL) 10 MG tablet Take 1 tablet by mouth every 6 hours as needed for Pain, Disp-20 tablet, R-0Normal      amLODIPine (NORVASC) 10 MG tablet take 1 tablet by mouth once  Fioricet, peers to have some mild sinusitis on CT, advised to use Flonase for the next 3 days.  Patient verbalized understanding of plan of care.  Medications   ketorolac (TORADOL) injection 30 mg (30 mg IntraVENous Given 1/4/24 1022)   prochlorperazine (COMPAZINE) injection 10 mg (10 mg IntraVENous Given 1/4/24 1022)   diphenhydrAMINE (BENADRYL) injection 25 mg (25 mg IntraVENous Given 1/4/24 1022)       Patient was seen independently by myself. The patient's final impression and disposition and plan was determined by myself.     CRITICAL CARE:   None    CONSULTS:  None    PROCEDURES:  None    FINAL IMPRESSION     1. Tension headache    2. Acute non-recurrent frontal sinusitis          DISPOSITION/PLAN   Patient discharged.    PATIENT REFERREDTO:  Burak Gaitan APRN - CNP  [address] [place] [address]  [phone]    Call   For follow up and evaluation, If symptoms worsen      DISCHARGE MEDICATIONS:  Discharge Medication List as of 1/4/2024 12:33 PM        START taking these medications    Details   butalbital-APAP-caffeine (FIORICET) -40 MG CAPS per capsule Take 1 capsule by mouth every 6 hours as needed for Headaches, Disp-20 capsule, R-0Normal      oxymetazoline (12 HOUR NASAL SPRAY) 0.05 % nasal spray 2 sprays by Nasal route 2 times daily for 3 days, Disp-15 mL, R-0Normal             (Please note that portions of this note were completed with a voice recognition program.  Efforts were made to edit the dictations but occasionally words are mis-transcribed.)    Provider:  I personally performed the services described in the documentation,reviewed and edited the documentation which was dictated to the scribe in my presence, and it accurately records my words and actions.    Jeremiah Osborn CNP 01/04/24 4:33 PM    MARK Pham - Jeremiah Montoya APRN - CNP  01/04/24 [id number]

## 2024-01-30 ENCOUNTER — OFFICE VISIT (OUTPATIENT)
Dept: CARDIOLOGY CLINIC | Age: 49
End: 2024-01-30

## 2024-01-30 VITALS
HEART RATE: 89 BPM | BODY MASS INDEX: 30.57 KG/M2 | WEIGHT: 206.4 LBS | HEIGHT: 69 IN | SYSTOLIC BLOOD PRESSURE: 132 MMHG | DIASTOLIC BLOOD PRESSURE: 96 MMHG

## 2024-01-30 DIAGNOSIS — I10 ESSENTIAL HYPERTENSION: Primary | ICD-10-CM

## 2024-01-30 DIAGNOSIS — Z82.49 FAMILY HISTORY OF EARLY CAD: ICD-10-CM

## 2024-01-30 DIAGNOSIS — K21.9 GASTROESOPHAGEAL REFLUX DISEASE WITHOUT ESOPHAGITIS: ICD-10-CM

## 2024-01-30 DIAGNOSIS — F17.200 TOBACCO USE DISORDER: ICD-10-CM

## 2024-01-30 PROCEDURE — 3080F DIAST BP >= 90 MM HG: CPT | Performed by: INTERNAL MEDICINE

## 2024-01-30 PROCEDURE — 99214 OFFICE O/P EST MOD 30 MIN: CPT | Performed by: INTERNAL MEDICINE

## 2024-01-30 PROCEDURE — 3075F SYST BP GE 130 - 139MM HG: CPT | Performed by: INTERNAL MEDICINE

## 2024-01-30 PROCEDURE — 93000 ELECTROCARDIOGRAM COMPLETE: CPT | Performed by: INTERNAL MEDICINE

## 2024-01-30 NOTE — PATIENT INSTRUCTIONS
Your Medical Assistant Today:  Jsesie    Your Provider for Today: Dr. Jimenes             You may receive a survey regarding the care you received during your visit.  Your input is valuable to us.  We encourage you to complete and return your survey.  We hope you will choose us in the future for your healthcare needs.

## 2024-01-30 NOTE — PROGRESS NOTES
file   Social Connections: Not on file   Intimate Partner Violence: Not on file   Housing Stability: Not on file       Current Outpatient Medications   Medication Sig Dispense Refill    butalbital-APAP-caffeine (FIORICET) -40 MG CAPS per capsule Take 1 capsule by mouth every 6 hours as needed for Headaches 20 capsule 0    ketorolac (TORADOL) 10 MG tablet Take 1 tablet by mouth every 6 hours as needed for Pain 20 tablet 0    amLODIPine (NORVASC) 10 MG tablet take 1 tablet by mouth once daily 90 tablet 3    escitalopram (LEXAPRO) 10 MG tablet Take 1 tablet by mouth daily 90 tablet 1    hydroCHLOROthiazide (HYDRODIURIL) 12.5 MG tablet take 1 tablet by mouth once daily 90 tablet 3    omeprazole (PRILOSEC) 10 MG delayed release capsule Take 1 capsule by mouth daily      acetaminophen (TYLENOL) 500 MG tablet Take 2 tablets by mouth every 6 hours as needed for Pain      Elastic Bandages & Supports (JOBST KNEE HIGH COMPRESSION SM) MISC 1 each by Does not apply route daily as needed (edema) 1 each 0    Multiple Vitamin (MULTI VITAMIN DAILY PO) Take by mouth      aspirin (ASPIRIN CHILDRENS) 81 MG chewable tablet Take 1 tablet by mouth daily 30 tablet 3     No current facility-administered medications for this visit.       Review of Systems -     General ROS: negative  Psychological ROS: negative  Hematological and Lymphatic ROS: No history of blood clots or bleeding disorder.   Respiratory ROS: no cough,  or wheezing, the rest see HPI  Cardiovascular ROS: See HPI  Gastrointestinal ROS: negative  Genito-Urinary ROS: no dysuria, trouble voiding, or hematuria  Musculoskeletal ROS: negative  Neurological ROS: no TIA or stroke symptoms  Dermatological ROS: negative      Blood pressure (!) 132/96, pulse 89, height 1.753 m (5' 9\"), weight 93.6 kg (206 lb 6.4 oz).        Physical Examination:    General appearance - alert, well appearing, and in no distress  HEENT- Pink conjunctiva  , Non-icteri sclera,PERRLA  Mental status -

## 2024-11-18 DIAGNOSIS — I10 ESSENTIAL HYPERTENSION: ICD-10-CM

## 2024-11-18 RX ORDER — AMLODIPINE BESYLATE 10 MG/1
10 TABLET ORAL DAILY
Qty: 90 TABLET | OUTPATIENT
Start: 2024-11-18

## 2024-11-18 RX ORDER — AMLODIPINE BESYLATE 10 MG/1
10 TABLET ORAL DAILY
Qty: 30 TABLET | Refills: 0 | Status: SHIPPED | OUTPATIENT
Start: 2024-11-18 | End: 2024-11-19

## 2024-11-18 NOTE — TELEPHONE ENCOUNTER
Recent Visits  No visits were found meeting these conditions.  Showing recent visits within past 540 days with a meds authorizing provider and meeting all other requirements  Future Appointments  Date Type Provider Dept   11/20/24 Appointment Burak Gaitan, MARK - CNP [facility]   Showing future appointments within next 150 days with a meds authorizing provider and meeting all other requirements

## 2024-11-19 ENCOUNTER — OFFICE VISIT (OUTPATIENT)
Dept: FAMILY MEDICINE CLINIC | Age: 49
End: 2024-11-19

## 2024-11-19 VITALS
DIASTOLIC BLOOD PRESSURE: 88 MMHG | HEART RATE: 64 BPM | OXYGEN SATURATION: 98 % | WEIGHT: 180.2 LBS | SYSTOLIC BLOOD PRESSURE: 122 MMHG | HEIGHT: 69 IN | BODY MASS INDEX: 26.69 KG/M2 | RESPIRATION RATE: 20 BRPM | TEMPERATURE: 97.4 F

## 2024-11-19 DIAGNOSIS — F17.210 CIGARETTE NICOTINE DEPENDENCE WITHOUT COMPLICATION: ICD-10-CM

## 2024-11-19 DIAGNOSIS — F41.1 GENERALIZED ANXIETY DISORDER: ICD-10-CM

## 2024-11-19 DIAGNOSIS — R73.09 ELEVATED GLUCOSE: ICD-10-CM

## 2024-11-19 DIAGNOSIS — I10 ESSENTIAL HYPERTENSION: Primary | ICD-10-CM

## 2024-11-19 PROBLEM — F17.200 TOBACCO USE DISORDER: Status: RESOLVED | Noted: 2018-01-23 | Resolved: 2024-11-19

## 2024-11-19 SDOH — ECONOMIC STABILITY: FOOD INSECURITY: WITHIN THE PAST 12 MONTHS, YOU WORRIED THAT YOUR FOOD WOULD RUN OUT BEFORE YOU GOT MONEY TO BUY MORE.: NEVER TRUE

## 2024-11-19 SDOH — ECONOMIC STABILITY: FOOD INSECURITY: WITHIN THE PAST 12 MONTHS, THE FOOD YOU BOUGHT JUST DIDN'T LAST AND YOU DIDN'T HAVE MONEY TO GET MORE.: NEVER TRUE

## 2024-11-19 SDOH — ECONOMIC STABILITY: INCOME INSECURITY: HOW HARD IS IT FOR YOU TO PAY FOR THE VERY BASICS LIKE FOOD, HOUSING, MEDICAL CARE, AND HEATING?: NOT HARD AT ALL

## 2024-11-19 ASSESSMENT — PATIENT HEALTH QUESTIONNAIRE - PHQ9
1. LITTLE INTEREST OR PLEASURE IN DOING THINGS: NOT AT ALL
SUM OF ALL RESPONSES TO PHQ9 QUESTIONS 1 & 2: 0
SUM OF ALL RESPONSES TO PHQ QUESTIONS 1-9: 0
2. FEELING DOWN, DEPRESSED OR HOPELESS: NOT AT ALL
SUM OF ALL RESPONSES TO PHQ QUESTIONS 1-9: 0

## 2024-11-19 NOTE — PROGRESS NOTES
Chief Complaint   Patient presents with    Hypertension     States has been without BP meds for two days       History obtained from chart review and the patient.    SUBJECTIVE:  Claudy Barnard is a 49 y.o. male that presents today for follow up anxiety/depression and HTN    Has lost quite a bit of weight  But has been a lot more active with work    Nicotine Dependence  Smokes about 1/2 ppd-has backed off on his own some  Drinks about 12 pack/week  Motivated to quit smoking about 5/10    HTN    Does patient check BP regularly at home? - No  Current Medication regimen - Norvasc and HCTZ  Tolerating medications well? - yes    Shortness of breath or chest pain? No  Headache or visual complaints? No  Neurologic changes like confusion? No  Extremity edema? No  Dizziness? no    BP Readings from Last 3 Encounters:   11/19/24 122/88   01/30/24 (!) 132/96   01/04/24 117/88     Wt Readings from Last 3 Encounters:   11/19/24 81.7 kg (180 lb 3.2 oz)   01/30/24 93.6 kg (206 lb 6.4 oz)   01/04/24 90.7 kg (200 lb)       Anxiety and Depression  Denies any issues with anxiety and depression  Has been out of Lexapro for awhile and has been doing well.    Age/Gender Health Maintenance    Lipid -   Lab Results   Component Value Date    CHOL 145 10/08/2015     Lab Results   Component Value Date    TRIG 94 10/08/2015     Lab Results   Component Value Date    HDL 41 10/08/2015     No components found for: \"LDLCHOLESTEROL\", \"LDLCALC\"    DM Screen -   Lab Results   Component Value Date    LABA1C 5.5 08/15/2022     No results found for: \"EAG\"  Colon Cancer Screening - 45  Lung Cancer Screening (Age 55 to 80 with 30 pack year hx, current smoker or quit within past 15 years) -50    Tetanus - needs  Influenza Vaccine - yearly  Pneumonia Vaccine - 65  Zostavax - 50  HPV Vaccine - n/a    PSA testing discussion - 55  AAA Screening -65    Falls screening - n/a    Current Outpatient Medications   Medication Sig Dispense Refill

## 2024-12-03 ENCOUNTER — TELEPHONE (OUTPATIENT)
Dept: FAMILY MEDICINE CLINIC | Age: 49
End: 2024-12-03

## 2024-12-03 ENCOUNTER — LAB (OUTPATIENT)
Dept: FAMILY MEDICINE CLINIC | Age: 49
End: 2024-12-03

## 2024-12-03 VITALS — DIASTOLIC BLOOD PRESSURE: 86 MMHG | SYSTOLIC BLOOD PRESSURE: 130 MMHG | HEART RATE: 90 BPM | OXYGEN SATURATION: 98 %

## 2024-12-03 NOTE — PROGRESS NOTES
Pt came in for a blood pressure check.     BP Readings from Last 3 Encounters:   12/03/24 130/86   11/19/24 122/88   01/30/24 (!) 132/96

## 2025-02-21 ENCOUNTER — HOSPITAL ENCOUNTER (EMERGENCY)
Age: 50
Discharge: HOME | End: 2025-02-21

## 2025-02-21 VITALS
RESPIRATION RATE: 18 BRPM | SYSTOLIC BLOOD PRESSURE: 139 MMHG | OXYGEN SATURATION: 97 % | HEIGHT: 69 IN | HEART RATE: 97 BPM | TEMPERATURE: 97.6 F | DIASTOLIC BLOOD PRESSURE: 94 MMHG | BODY MASS INDEX: 26.22 KG/M2 | WEIGHT: 177 LBS

## 2025-02-21 DIAGNOSIS — H60.12 CELLULITIS OF LEFT EAR CANAL: Primary | ICD-10-CM

## 2025-02-21 PROCEDURE — 99213 OFFICE O/P EST LOW 20 MIN: CPT

## 2025-02-21 RX ORDER — CIPROFLOXACIN AND DEXAMETHASONE 3; 1 MG/ML; MG/ML
4 SUSPENSION/ DROPS AURICULAR (OTIC) 2 TIMES DAILY
Qty: 7.5 ML | Refills: 0 | Status: SHIPPED | OUTPATIENT
Start: 2025-02-21 | End: 2025-02-28

## 2025-02-21 ASSESSMENT — PAIN - FUNCTIONAL ASSESSMENT: PAIN_FUNCTIONAL_ASSESSMENT: NONE - DENIES PAIN

## 2025-02-21 NOTE — ED PROVIDER NOTES
[facility] URGENT CARE  Urgent Care Encounter       CHIEF COMPLAINT       Chief Complaint   Patient presents with    Ear Fullness     left    Ear Pain     left       Nurses Notes reviewed and I agree except as noted in the HPI.  HISTORY OF PRESENT ILLNESS   Claudy Barnard is a 49 y.o. male who presents with complaints of left ear fullness, pain and bleeding. Pt reports last week he had a viral illness that went away. Pt reports that since that went away he has been having ear pain and it feels like his ear needs to pop. Pt reports that it feels swollen. Pt reports that he put a qtip in his ear today and there was blood on his qtip when he took it out. Pt reports he got concerned and decided to come in.     The history is provided by the patient.       REVIEW OF SYSTEMS     Review of Systems   HENT:  Positive for ear discharge and ear pain.    All other systems reviewed and are negative.      PAST MEDICAL HISTORY         Diagnosis Date    Blind     left eye    Essential hypertension 6/21/2017    Gastroesophageal reflux disease without esophagitis 1/29/2018    Gastroesophageal reflux disease without esophagitis 1/29/2018    Hypertension     10/7/2015    Tobacco use disorder 1/23/2018       SURGICALHISTORY     Patient  has a past surgical history that includes eye surgery.    CURRENT MEDICATIONS       Previous Medications    ACETAMINOPHEN (TYLENOL) 500 MG TABLET    Take 2 tablets by mouth every 6 hours as needed for Pain    ASPIRIN (ASPIRIN CHILDRENS) 81 MG CHEWABLE TABLET    Take 1 tablet by mouth daily    BUTALBITAL-APAP-CAFFEINE (FIORICET) -40 MG CAPS PER CAPSULE    Take 1 capsule by mouth every 6 hours as needed for Headaches    ELASTIC BANDAGES & SUPPORTS (JOBST KNEE HIGH COMPRESSION SM) MISC    1 each by Does not apply route daily as needed (edema)    MULTIPLE VITAMIN (MULTI VITAMIN DAILY PO)    Take by mouth    OMEPRAZOLE (PRILOSEC) 10 MG DELAYED RELEASE CAPSULE    Take 1 capsule by mouth daily

## 2025-05-03 ENCOUNTER — HOSPITAL ENCOUNTER (EMERGENCY)
Age: 50
Discharge: HOME | End: 2025-05-03
Attending: EMERGENCY MEDICINE
Payer: COMMERCIAL

## 2025-05-03 ENCOUNTER — APPOINTMENT (OUTPATIENT)
Dept: CT IMAGING | Age: 50
End: 2025-05-03
Payer: COMMERCIAL

## 2025-05-03 VITALS
HEIGHT: 69 IN | SYSTOLIC BLOOD PRESSURE: 161 MMHG | HEART RATE: 94 BPM | WEIGHT: 180 LBS | OXYGEN SATURATION: 98 % | BODY MASS INDEX: 26.66 KG/M2 | RESPIRATION RATE: 18 BRPM | DIASTOLIC BLOOD PRESSURE: 98 MMHG | TEMPERATURE: 97.5 F

## 2025-05-03 DIAGNOSIS — S39.012A STRAIN OF LUMBAR REGION, INITIAL ENCOUNTER: Primary | ICD-10-CM

## 2025-05-03 LAB
ALBUMIN SERPL BCG-MCNC: 4.4 G/DL (ref 3.4–4.9)
ALP SERPL-CCNC: 54 U/L (ref 40–129)
ALT SERPL W/O P-5'-P-CCNC: 21 U/L (ref 10–50)
ANION GAP SERPL CALC-SCNC: 12 MEQ/L (ref 8–16)
AST SERPL-CCNC: 19 U/L (ref 10–50)
BACTERIA URNS QL MICRO: ABNORMAL /HPF
BASOPHILS ABSOLUTE: 0.1 THOU/MM3 (ref 0–0.1)
BASOPHILS NFR BLD AUTO: 1.3 %
BILIRUB CONJ SERPL-MCNC: 0.2 MG/DL (ref 0–0.2)
BILIRUB SERPL-MCNC: 0.6 MG/DL (ref 0.3–1.2)
BILIRUB UR QL STRIP.AUTO: NEGATIVE
BUN SERPL-MCNC: 13 MG/DL (ref 8–23)
CALCIUM SERPL-MCNC: 9.6 MG/DL (ref 8.6–10)
CASTS #/AREA URNS LPF: ABNORMAL /LPF
CASTS 2: ABNORMAL /LPF
CHARACTER UR: ABNORMAL
CHLORIDE SERPL-SCNC: 106 MEQ/L (ref 98–111)
CO2 SERPL-SCNC: 22 MEQ/L (ref 22–29)
COLOR, UA: YELLOW
CREAT SERPL-MCNC: 0.8 MG/DL (ref 0.7–1.2)
CRYSTALS URNS MICRO: ABNORMAL
DEPRECATED RDW RBC AUTO: 47.3 FL (ref 35–45)
EOSINOPHIL NFR BLD AUTO: 2.4 %
EOSINOPHILS ABSOLUTE: 0.2 THOU/MM3 (ref 0–0.4)
EPITHELIAL CELLS, UA: ABNORMAL /HPF
ERYTHROCYTE [DISTWIDTH] IN BLOOD BY AUTOMATED COUNT: 13.4 % (ref 11.5–14.5)
GFR SERPL CREATININE-BSD FRML MDRD: > 90 ML/MIN/1.73M2
GLUCOSE SERPL-MCNC: 102 MG/DL (ref 74–109)
GLUCOSE UR QL STRIP.AUTO: NEGATIVE MG/DL
HCT VFR BLD AUTO: 46.3 % (ref 42–52)
HGB BLD-MCNC: 15.8 GM/DL (ref 14–18)
HGB UR QL STRIP.AUTO: NEGATIVE
IMM GRANULOCYTES # BLD AUTO: 0.03 THOU/MM3 (ref 0–0.07)
IMM GRANULOCYTES NFR BLD AUTO: 0.4 %
KETONES UR QL STRIP.AUTO: ABNORMAL
LACTIC ACID, SEPSIS: 0.7 MMOL/L (ref 0.5–1.9)
LIPASE SERPL-CCNC: 44 U/L (ref 13–60)
LYMPHOCYTES ABSOLUTE: 1.9 THOU/MM3 (ref 1–4.8)
LYMPHOCYTES NFR BLD AUTO: 26 %
MCH RBC QN AUTO: 32.5 PG (ref 26–33)
MCHC RBC AUTO-ENTMCNC: 34.1 GM/DL (ref 32.2–35.5)
MCV RBC AUTO: 95.3 FL (ref 80–94)
MISCELLANEOUS 2: ABNORMAL
MONOCYTES ABSOLUTE: 0.4 THOU/MM3 (ref 0.4–1.3)
MONOCYTES NFR BLD AUTO: 6 %
NEUTROPHILS ABSOLUTE: 4.6 THOU/MM3 (ref 1.8–7.7)
NEUTROPHILS NFR BLD AUTO: 63.9 %
NITRITE UR QL STRIP: NEGATIVE
NRBC BLD AUTO-RTO: 0 /100 WBC
OSMOLALITY SERPL CALC.SUM OF ELEC: 279.7 MOSMOL/KG (ref 275–300)
PH UR STRIP.AUTO: 5.5 [PH] (ref 5–9)
PLATELET # BLD AUTO: 320 THOU/MM3 (ref 130–400)
PMV BLD AUTO: 8.5 FL (ref 9.4–12.4)
POTASSIUM SERPL-SCNC: 4.5 MEQ/L (ref 3.5–5.2)
PROT SERPL-MCNC: 7 G/DL (ref 6.4–8.3)
PROT UR STRIP.AUTO-MCNC: NEGATIVE MG/DL
RBC # BLD AUTO: 4.86 MILL/MM3 (ref 4.7–6.1)
RBC URINE: ABNORMAL /HPF
RENAL EPI CELLS #/AREA URNS HPF: ABNORMAL /[HPF]
SODIUM SERPL-SCNC: 140 MEQ/L (ref 135–145)
SP GR UR REFRACT.AUTO: 1.02 (ref 1–1.03)
UROBILINOGEN, URINE: 0.2 EU/DL (ref 0–1)
WBC # BLD AUTO: 7.2 THOU/MM3 (ref 4.8–10.8)
WBC #/AREA URNS HPF: ABNORMAL /HPF
WBC #/AREA URNS HPF: NEGATIVE /[HPF]
YEAST LIKE FUNGI URNS QL MICRO: ABNORMAL

## 2025-05-03 PROCEDURE — 80053 COMPREHEN METABOLIC PANEL: CPT

## 2025-05-03 PROCEDURE — 85025 COMPLETE CBC W/AUTO DIFF WBC: CPT

## 2025-05-03 PROCEDURE — 83605 ASSAY OF LACTIC ACID: CPT

## 2025-05-03 PROCEDURE — 99284 EMERGENCY DEPT VISIT MOD MDM: CPT

## 2025-05-03 PROCEDURE — 76376 3D RENDER W/INTRP POSTPROCES: CPT

## 2025-05-03 PROCEDURE — 82248 BILIRUBIN DIRECT: CPT

## 2025-05-03 PROCEDURE — 6360000002 HC RX W HCPCS

## 2025-05-03 PROCEDURE — 74176 CT ABD & PELVIS W/O CONTRAST: CPT

## 2025-05-03 PROCEDURE — 96374 THER/PROPH/DIAG INJ IV PUSH: CPT

## 2025-05-03 PROCEDURE — 96375 TX/PRO/DX INJ NEW DRUG ADDON: CPT

## 2025-05-03 PROCEDURE — 81001 URINALYSIS AUTO W/SCOPE: CPT

## 2025-05-03 PROCEDURE — 36415 COLL VENOUS BLD VENIPUNCTURE: CPT

## 2025-05-03 PROCEDURE — 2580000003 HC RX 258

## 2025-05-03 PROCEDURE — 83690 ASSAY OF LIPASE: CPT

## 2025-05-03 RX ORDER — ORPHENADRINE CITRATE 30 MG/ML
60 INJECTION INTRAMUSCULAR; INTRAVENOUS ONCE
Status: COMPLETED | OUTPATIENT
Start: 2025-05-03 | End: 2025-05-03

## 2025-05-03 RX ORDER — ONDANSETRON 2 MG/ML
4 INJECTION INTRAMUSCULAR; INTRAVENOUS ONCE
Status: COMPLETED | OUTPATIENT
Start: 2025-05-03 | End: 2025-05-03

## 2025-05-03 RX ORDER — LIDOCAINE 4 G/G
1 PATCH TOPICAL DAILY
Status: DISCONTINUED | OUTPATIENT
Start: 2025-05-03 | End: 2025-05-03 | Stop reason: HOSPADM

## 2025-05-03 RX ORDER — 0.9 % SODIUM CHLORIDE 0.9 %
1000 INTRAVENOUS SOLUTION INTRAVENOUS ONCE
Status: COMPLETED | OUTPATIENT
Start: 2025-05-03 | End: 2025-05-03

## 2025-05-03 RX ORDER — LIDOCAINE 4 G/G
1 PATCH TOPICAL EVERY OTHER DAY
Qty: 5 PATCH | Refills: 0 | Status: SHIPPED | OUTPATIENT
Start: 2025-05-03 | End: 2025-05-13

## 2025-05-03 RX ORDER — CYCLOBENZAPRINE HCL 5 MG
5 TABLET ORAL DAILY
Qty: 10 TABLET | Refills: 0 | Status: SHIPPED | OUTPATIENT
Start: 2025-05-03 | End: 2025-05-13

## 2025-05-03 RX ORDER — KETOROLAC TROMETHAMINE 30 MG/ML
15 INJECTION, SOLUTION INTRAMUSCULAR; INTRAVENOUS ONCE
Status: COMPLETED | OUTPATIENT
Start: 2025-05-03 | End: 2025-05-03

## 2025-05-03 RX ADMIN — ORPHENADRINE CITRATE 60 MG: 60 INJECTION INTRAMUSCULAR; INTRAVENOUS at 17:02

## 2025-05-03 RX ADMIN — KETOROLAC TROMETHAMINE 15 MG: 30 INJECTION, SOLUTION INTRAMUSCULAR at 17:02

## 2025-05-03 RX ADMIN — SODIUM CHLORIDE 1000 ML: 0.9 INJECTION, SOLUTION INTRAVENOUS at 17:05

## 2025-05-03 RX ADMIN — ONDANSETRON 4 MG: 2 INJECTION, SOLUTION INTRAMUSCULAR; INTRAVENOUS at 17:02

## 2025-05-03 ASSESSMENT — PAIN SCALES - GENERAL: PAINLEVEL_OUTOF10: 2

## 2025-05-03 NOTE — ED PROVIDER NOTES
[facility]  EMERGENCY DEPARTMENT ENCOUNTER          Pt Name: Claudy Barnard  MRN: [id number]  Birthdate 1975  Date of evaluation: 5/3/2025  Treating Resident Physician: Narinder Florez MD  Supervising Physician: Garcia Rosales DO    History obtained from the patient.     CHIEF COMPLAINT       Chief Complaint   Patient presents with    Back Pain     lower       HISTORY OF PRESENT ILLNESS    Claudy Barnard is a 49 y.o. male with a PMH of hypertension, GERD, and tobacco use who presents to the emergency department complaining of lower back pain.  He localizes the pain to the lumbar paraspinous area extending to the left flank.  He reports symptoms started 3 days ago after awakening.  He denies trauma or excessive exercise.  He woke up from sleep and felt the sudden pain to the area.  Initially, it was localized to that area but yesterday and today he has been having some radiation to his groin.  He attests that his testicles are within the scrotal sac, denies any swelling, hematuria, dysuria, or discharge.  Patient rates severity of pain 7/10, worse with laying flat and better with sitting upright.  He endorses associated nausea without vomiting, and denies marijuana use or illicit drug use, fever, history of kidney stones, changes in bowel movement, paresthesias.  Review of systems unremarkable except for aforementioned.      Triage notes and Nursing notes were reviewed by myself.  Any discrepancies are addressed above.    PAST MEDICAL HISTORY     Past Medical History:   Diagnosis Date    Blind     left eye    Essential hypertension 6/21/2017    Gastroesophageal reflux disease without esophagitis 1/29/2018    Gastroesophageal reflux disease without esophagitis 1/29/2018    Hypertension     10/7/2015    Tobacco use disorder 1/23/2018       SURGICAL HISTORY       Past Surgical History:   Procedure Laterality Date    EYE SURGERY      Glass eye placement, left eye.   pancreatitis, lumbar fracture    Although some of these diagnoses may be less likely, they were factored into the medical decision-making process to ensure thorough assessment and appropriate management.      Plan:   Imaging  Labs  Pain management  Outpatient follow-up           CLINICAL       1. Strain of lumbar region, initial encounter          DISPOSITION/PLAN   DISPOSITION Decision To Discharge 05/03/2025 06:57:19 PM   DISPOSITION CONDITION Stable           PATIENT REFERRED TO:  Burak Gaitan, APRN - CNP  [address] [place] [address]  [phone]    In 5 days        DISCHARGE MEDICATIONS:  Discharge Medication List as of 5/3/2025  7:32 PM        START taking these medications    Details   lidocaine 4 % external patch Place 1 patch onto the skin every other day for 10 days, TransDERmal, EVERY OTHER DAY Starting Sat 5/3/2025, Until Tue 5/13/2025, For 10 days, Disp-5 patch, R-0, Normal      cyclobenzaprine (FLEXERIL) 5 MG tablet Take 1 tablet by mouth Daily for 10 days, Disp-10 tablet, R-0Normal                    (Please note that portions of this note were completed with a voice recognition program.  Efforts were made to edit the dictations but occasionally words are mis-transcribed.)

## 2025-05-03 NOTE — ED TRIAGE NOTES
PT to the ED with lower back pain starting about 3 days ago. PT states pain is all agross the lower back and a constant sharp pain. PT states every once in a while there is a shooting pain into the lower abdomen/ groin area. PT denies any known injuries.

## 2025-05-03 NOTE — DISCHARGE INSTRUCTIONS
You were seen in the ED for back pain.  We believe this is muscular strain.  Your discharge instructions to follow-up outpatient with your primary care provider.  Alternate between Tylenol and ibuprofen for pain management.  You are given a prescription of lidocaine patch and Flexeril, which is a muscle relaxant.  Apply the lidocaine patch every other day and take the muscle relaxants as prescribed and as needed.  Heating pads could be helpful as well with the symptoms.  If you continue experiencing worsening symptoms despite following the aforementioned steps, do not hesitate to return to the ED and/or call your primary care provider for further evaluation.

## 2025-05-03 NOTE — ED PROVIDER NOTES
I performed a history and physical examination of the patient and discussed management with the resident. I reviewed the resident’s note and agree with the documented findings and plan of care. Any areas of disagreement are noted on the chart. I was personally present for the key portions of any procedures. I have documented in the chart those procedures where I was not present during the key portions. I have reviewed the emergency nurses triage note. I agree with the chief complaint, past medical history, past surgical history, allergies, medications, social and family history as documented unless otherwise noted below. Documentation of the HPI, Physical Exam and Medical Decision Making performed by medical students or scribes is based on my personal performance of the HPI, PE and MDM. For Phys Assistant/ Nurse Practitioner cases/documentation I have personally evaluated this patient and have completed at least one if not all key elements of the E/M (history, physical exam, and MDM). My findings are as noted below.    In other words, I personally saw and examined the patient I have reviewed and agreed with the resident findings including all diagnostic interpretations and treatment plans as written.  I was present for the key portion of any procedures performed and the inclusive time noted in any critical care statement.    Patient presents today for left-sided flank pain back pain patient states onset approximately about 3 days.  Patient states this radiates around to the front.  Patient states is not into his testicles.  Patient denies any trauma.  He cannot remember if he did anything wrong to hurt himself.  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  He has had some minor back problems off and on.  Here today the patient is resting comfortably on the apparent distress.  He is neurologically intact.  No saddle anesthesia or paresthesias no difficulty with bowel or bladder problems.  No radiation  down the legs.  Denies any really overt abdominal pain.  No chest pain shortness of breath.  Some nausea without vomiting.  No diarrhea.  No fevers chills sweats.  Patient otherwise resting comfortably on the cot no apparent distress no other physical complaints at this time.      CT ABDOMEN PELVIS WO CONTRAST Additional Contrast? None    (Results Pending)   CT LUMBAR RECONSTRUCTION WO POST PROCESS    (Results Pending)     Labs Reviewed   CBC WITH AUTO DIFFERENTIAL - Abnormal; Notable for the following components:       Result Value    MCV 95.3 (*)     RDW-SD 47.3 (*)     MPV 8.5 (*)     All other components within normal limits   BASIC METABOLIC PANEL   LACTATE, SEPSIS   HEPATIC FUNCTION PANEL   LIPASE   URINALYSIS WITH REFLEX TO CULTURE       Not applicable    Final diagnoses:   None   .  I have seen this patient with the resident Dr. Florez and agree with his assessment and plan

## 2025-09-04 ENCOUNTER — HOSPITAL ENCOUNTER (EMERGENCY)
Age: 50
Discharge: HOME | End: 2025-09-04
Attending: EMERGENCY MEDICINE
Payer: COMMERCIAL

## 2025-09-04 ENCOUNTER — APPOINTMENT (OUTPATIENT)
Dept: GENERAL RADIOLOGY | Age: 50
End: 2025-09-04
Payer: COMMERCIAL

## 2025-09-04 VITALS
OXYGEN SATURATION: 97 % | TEMPERATURE: 98.2 F | SYSTOLIC BLOOD PRESSURE: 140 MMHG | HEART RATE: 64 BPM | RESPIRATION RATE: 24 BRPM | DIASTOLIC BLOOD PRESSURE: 97 MMHG

## 2025-09-04 DIAGNOSIS — R07.9 CHEST PAIN, UNSPECIFIED TYPE: Primary | ICD-10-CM

## 2025-09-04 DIAGNOSIS — F17.210 CIGARETTE SMOKER: ICD-10-CM

## 2025-09-04 DIAGNOSIS — I10 ESSENTIAL HYPERTENSION: ICD-10-CM

## 2025-09-04 LAB
ANION GAP SERPL CALC-SCNC: 11 MEQ/L (ref 8–16)
BASOPHILS ABSOLUTE: 0.1 THOU/MM3 (ref 0–0.1)
BASOPHILS NFR BLD AUTO: 0.7 %
BUN SERPL-MCNC: 17 MG/DL (ref 8–23)
CALCIUM SERPL-MCNC: 8.9 MG/DL (ref 8.5–10.5)
CHLORIDE SERPL-SCNC: 106 MEQ/L (ref 98–111)
CO2 SERPL-SCNC: 23 MEQ/L (ref 22–29)
CREAT SERPL-MCNC: 0.9 MG/DL (ref 0.7–1.2)
DEPRECATED RDW RBC AUTO: 44.6 FL (ref 35–45)
EOSINOPHIL NFR BLD AUTO: 2.5 %
EOSINOPHILS ABSOLUTE: 0.2 THOU/MM3 (ref 0–0.4)
ERYTHROCYTE [DISTWIDTH] IN BLOOD BY AUTOMATED COUNT: 12.3 % (ref 11.5–14.5)
GFR SERPL CREATININE-BSD FRML MDRD: > 90 ML/MIN/1.73M2
GLUCOSE SERPL-MCNC: 111 MG/DL (ref 74–109)
HCT VFR BLD AUTO: 44.7 % (ref 42–52)
HGB BLD-MCNC: 15.2 GM/DL (ref 14–18)
IMM GRANULOCYTES # BLD AUTO: 0.02 THOU/MM3 (ref 0–0.07)
IMM GRANULOCYTES NFR BLD AUTO: 0.2 %
LYMPHOCYTES ABSOLUTE: 2.4 THOU/MM3 (ref 1–4.8)
LYMPHOCYTES NFR BLD AUTO: 29.5 %
MCH RBC QN AUTO: 33.4 PG (ref 26–33)
MCHC RBC AUTO-ENTMCNC: 34 GM/DL (ref 32.2–35.5)
MCV RBC AUTO: 98.2 FL (ref 80–94)
MONOCYTES ABSOLUTE: 0.6 THOU/MM3 (ref 0.4–1.3)
MONOCYTES NFR BLD AUTO: 6.9 %
NEUTROPHILS ABSOLUTE: 4.8 THOU/MM3 (ref 1.8–7.7)
NEUTROPHILS NFR BLD AUTO: 60.2 %
NRBC BLD AUTO-RTO: 0 /100 WBC
OSMOLALITY SERPL CALC.SUM OF ELEC: 281.6 MOSMOL/KG (ref 275–300)
PLATELET # BLD AUTO: 302 THOU/MM3 (ref 130–400)
PMV BLD AUTO: 8.7 FL (ref 9.4–12.4)
POTASSIUM SERPL-SCNC: 3.9 MEQ/L (ref 3.5–5.2)
RBC # BLD AUTO: 4.55 MILL/MM3 (ref 4.7–6.1)
SODIUM SERPL-SCNC: 140 MEQ/L (ref 135–145)
TROPONIN, HIGH SENSITIVITY: < 6 NG/L (ref 0–12)
TROPONIN, HIGH SENSITIVITY: < 6 NG/L (ref 0–12)
WBC # BLD AUTO: 8 THOU/MM3 (ref 4.8–10.8)

## 2025-09-04 PROCEDURE — 99285 EMERGENCY DEPT VISIT HI MDM: CPT

## 2025-09-04 PROCEDURE — 85025 COMPLETE CBC W/AUTO DIFF WBC: CPT

## 2025-09-04 PROCEDURE — 6360000002 HC RX W HCPCS: Performed by: EMERGENCY MEDICINE

## 2025-09-04 PROCEDURE — 96374 THER/PROPH/DIAG INJ IV PUSH: CPT

## 2025-09-04 PROCEDURE — 6370000000 HC RX 637 (ALT 250 FOR IP): Performed by: EMERGENCY MEDICINE

## 2025-09-04 PROCEDURE — 84484 ASSAY OF TROPONIN QUANT: CPT

## 2025-09-04 PROCEDURE — 80048 BASIC METABOLIC PNL TOTAL CA: CPT

## 2025-09-04 PROCEDURE — 93005 ELECTROCARDIOGRAM TRACING: CPT | Performed by: EMERGENCY MEDICINE

## 2025-09-04 PROCEDURE — 2580000003 HC RX 258: Performed by: EMERGENCY MEDICINE

## 2025-09-04 PROCEDURE — 71045 X-RAY EXAM CHEST 1 VIEW: CPT

## 2025-09-04 PROCEDURE — 36415 COLL VENOUS BLD VENIPUNCTURE: CPT

## 2025-09-04 RX ORDER — ONDANSETRON 2 MG/ML
4 INJECTION INTRAMUSCULAR; INTRAVENOUS ONCE
Status: COMPLETED | OUTPATIENT
Start: 2025-09-04 | End: 2025-09-04

## 2025-09-04 RX ORDER — NITROGLYCERIN 0.4 MG/1
0.4 TABLET SUBLINGUAL EVERY 5 MIN PRN
Status: DISCONTINUED | OUTPATIENT
Start: 2025-09-04 | End: 2025-09-04 | Stop reason: HOSPADM

## 2025-09-04 RX ORDER — ASPIRIN 81 MG/1
324 TABLET, CHEWABLE ORAL ONCE
Status: COMPLETED | OUTPATIENT
Start: 2025-09-04 | End: 2025-09-04

## 2025-09-04 RX ORDER — AMLODIPINE BESYLATE 5 MG/1
5 TABLET ORAL DAILY
Qty: 90 TABLET | Refills: 0 | Status: SHIPPED | OUTPATIENT
Start: 2025-09-04 | End: 2025-09-05

## 2025-09-04 RX ORDER — 0.9 % SODIUM CHLORIDE 0.9 %
1000 INTRAVENOUS SOLUTION INTRAVENOUS ONCE
Status: COMPLETED | OUTPATIENT
Start: 2025-09-04 | End: 2025-09-04

## 2025-09-04 RX ORDER — NICOTINE 21 MG/24HR
1 PATCH, TRANSDERMAL 24 HOURS TRANSDERMAL DAILY
Qty: 42 PATCH | Refills: 0 | Status: SHIPPED | OUTPATIENT
Start: 2025-09-04 | End: 2025-10-16

## 2025-09-04 RX ADMIN — SODIUM CHLORIDE 1000 ML: 0.9 INJECTION, SOLUTION INTRAVENOUS at 19:14

## 2025-09-04 RX ADMIN — NITROGLYCERIN 0.4 MG: 0.4 TABLET SUBLINGUAL at 19:12

## 2025-09-04 RX ADMIN — ASPIRIN 324 MG: 81 TABLET, CHEWABLE ORAL at 19:11

## 2025-09-04 RX ADMIN — ONDANSETRON 4 MG: 2 INJECTION, SOLUTION INTRAMUSCULAR; INTRAVENOUS at 19:17

## 2025-09-04 ASSESSMENT — HEART SCORE
ECG: NORMAL
ECG: NORMAL

## 2025-09-04 ASSESSMENT — PAIN - FUNCTIONAL ASSESSMENT: PAIN_FUNCTIONAL_ASSESSMENT: 0-10

## 2025-09-04 ASSESSMENT — PAIN SCALES - GENERAL: PAINLEVEL_OUTOF10: 4

## 2025-09-05 LAB
EKG ATRIAL RATE: 80 BPM
EKG P AXIS: 67 DEGREES
EKG P-R INTERVAL: 148 MS
EKG Q-T INTERVAL: 376 MS
EKG Q-T INTERVAL: 408 MS
EKG QRS DURATION: 102 MS
EKG QRS DURATION: 102 MS
EKG QTC CALCULATION (BAZETT): 414 MS
EKG QTC CALCULATION (BAZETT): 433 MS
EKG R AXIS: 66 DEGREES
EKG R AXIS: 69 DEGREES
EKG T AXIS: 42 DEGREES
EKG T AXIS: 53 DEGREES
EKG VENTRICULAR RATE: 62 BPM
EKG VENTRICULAR RATE: 80 BPM

## 2025-09-05 RX ORDER — AMLODIPINE BESYLATE 5 MG/1
5 TABLET ORAL DAILY
Qty: 90 TABLET | Refills: 0 | Status: SHIPPED | OUTPATIENT
Start: 2025-09-05